# Patient Record
Sex: FEMALE | Race: WHITE | NOT HISPANIC OR LATINO | Employment: STUDENT | URBAN - METROPOLITAN AREA
[De-identification: names, ages, dates, MRNs, and addresses within clinical notes are randomized per-mention and may not be internally consistent; named-entity substitution may affect disease eponyms.]

---
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
## 2023-01-11 ENCOUNTER — HOSPITAL ENCOUNTER (OUTPATIENT)
Dept: ULTRASOUND IMAGING | Facility: HOSPITAL | Age: 18
Discharge: HOME | End: 2023-01-11
Attending: INTERNAL MEDICINE

## 2023-01-11 DIAGNOSIS — R22.1 NECK SWELLING: ICD-10-CM

## 2023-03-03 ENCOUNTER — OFFICE VISIT (OUTPATIENT)
Dept: INTERNAL MEDICINE CLINIC | Facility: CLINIC | Age: 18
End: 2023-03-03

## 2023-03-03 VITALS
SYSTOLIC BLOOD PRESSURE: 98 MMHG | HEART RATE: 95 BPM | OXYGEN SATURATION: 98 % | BODY MASS INDEX: 21.31 KG/M2 | WEIGHT: 124.8 LBS | HEIGHT: 64 IN | DIASTOLIC BLOOD PRESSURE: 60 MMHG | RESPIRATION RATE: 16 BRPM

## 2023-03-03 DIAGNOSIS — F41.9 ANXIETY: Primary | ICD-10-CM

## 2023-03-03 DIAGNOSIS — F17.298 OTHER TOBACCO PRODUCT NICOTINE DEPENDENCE WITH OTHER NICOTINE-INDUCED DISORDER: ICD-10-CM

## 2023-03-03 DIAGNOSIS — D64.9 ANEMIA, UNSPECIFIED TYPE: ICD-10-CM

## 2023-03-03 RX ORDER — SERTRALINE HYDROCHLORIDE 25 MG/1
TABLET, FILM COATED ORAL
Qty: 30 TABLET | Refills: 0 | Status: SHIPPED | OUTPATIENT
Start: 2023-03-03

## 2023-03-03 RX ORDER — FLUCONAZOLE 150 MG/1
150 TABLET ORAL ONCE
COMMUNITY
Start: 2022-12-29

## 2023-03-16 ENCOUNTER — APPOINTMENT (EMERGENCY)
Dept: RADIOLOGY | Facility: HOSPITAL | Age: 18
End: 2023-03-16

## 2023-03-16 ENCOUNTER — HOSPITAL ENCOUNTER (EMERGENCY)
Facility: HOSPITAL | Age: 18
Discharge: HOME | End: 2023-03-16
Attending: EMERGENCY MEDICINE

## 2023-03-16 VITALS
HEART RATE: 90 BPM | SYSTOLIC BLOOD PRESSURE: 115 MMHG | RESPIRATION RATE: 18 BRPM | DIASTOLIC BLOOD PRESSURE: 60 MMHG | TEMPERATURE: 98.3 F | OXYGEN SATURATION: 100 %

## 2023-03-16 DIAGNOSIS — S69.92XA LEFT WRIST INJURY, INITIAL ENCOUNTER: ICD-10-CM
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
## 2023-03-16 RX ORDER — IBUPROFEN 600 MG/1
600 TABLET ORAL ONCE
Status: DISCONTINUED | OUTPATIENT
Start: 2023-03-16 | End: 2023-03-16 | Stop reason: HOSPADM

## 2023-03-16 NOTE — Clinical Note
Kan Clement was seen and treated in our emergency department on 3/16/2023  Diagnosis:     Rosamaria Russo  may return to work on return date  She may return on this date: 03/20/2023         If you have any questions or concerns, please don't hesitate to call        Philipp Raza PA-C    ______________________________           _______________          _______________  Hospital Representative                              Date                                Time
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
## 2023-05-11 DIAGNOSIS — Z30.41 ENCOUNTER FOR SURVEILLANCE OF CONTRACEPTIVE PILLS: ICD-10-CM

## 2023-05-11 RX ORDER — NORGESTIMATE AND ETHINYL ESTRADIOL 7DAYSX3 28
KIT ORAL
Qty: 28 TABLET | Refills: 11 | Status: SHIPPED | OUTPATIENT
Start: 2023-05-11

## 2023-05-16 ENCOUNTER — OFFICE VISIT (OUTPATIENT)
Dept: OBGYN CLINIC | Facility: CLINIC | Age: 18
End: 2023-05-16

## 2023-05-16 VITALS
DIASTOLIC BLOOD PRESSURE: 84 MMHG | WEIGHT: 118 LBS | HEIGHT: 64 IN | SYSTOLIC BLOOD PRESSURE: 132 MMHG | BODY MASS INDEX: 20.14 KG/M2

## 2023-05-16 DIAGNOSIS — Z11.3 SCREENING FOR STDS (SEXUALLY TRANSMITTED DISEASES): Primary | ICD-10-CM

## 2023-05-16 NOTE — PROGRESS NOTES
Assessment/Plan:    Screening for STDs (sexually transmitted diseases)  -declined serology testing    -condoms recommended with all sexual activity to prevent STIs  -call with any questions or concerns  F/u for annual         Diagnoses and all orders for this visit:    Screening for STDs (sexually transmitted diseases)  -     Chlamydia/GC amplified DNA by PCR  -     Trichomonas vaginalis Thin prep          Subjective:      Patient ID: Nai Luna is a 19 y o  female presents for STD screening  She reports recent unprotected intercourse with a new partner and would be to be safe  Recently treated for chlamydia 6 months ago  She denies any symptoms or concerns today  She uses OCPs for contraception with consistent use  The following portions of the patient's history were reviewed and updated as appropriate:   She  has a past medical history of Anxiety, Candida infection of genital region (6/17/2020), and Tonsillitis  She   Patient Active Problem List    Diagnosis Date Noted   • Surveillance of contraceptive pill 06/12/2019   • Vitamin D deficiency 07/09/2018   • Anxiety 09/18/2017     She  has a past surgical history that includes No past surgeries  Her family history includes ADD / ADHD in her sister; Anxiety disorder in her mother and sister  She  reports that she has been smoking e-cigarettes  She has never used smokeless tobacco  She reports that she does not drink alcohol and does not use drugs  Current Outpatient Medications   Medication Sig Dispense Refill   • norgestimate-ethinyl estradiol (ORTHO TRI-CYCLEN,TRINESSA) 0 18/0 215/0 25 MG-35 MCG per tablet TAKE 1 TABLET BY MOUTH EVERY DAY 28 tablet 11   • fluconazole (DIFLUCAN) 150 mg tablet Take 150 mg by mouth once (Patient not taking: Reported on 5/16/2023)     • sertraline (ZOLOFT) 25 mg tablet Take 25 mg PO x 2 weeks; then 25 mg PO every other day x 2 weeks; then STOP  30 tablet 0     No current facility-administered medications for this visit  "    She has No Known Allergies       Review of Systems   Constitutional: Negative for chills and fever  Gastrointestinal: Negative for abdominal pain  Genitourinary: Negative for dyspareunia, dysuria, genital sores, menstrual problem, pelvic pain, vaginal bleeding, vaginal discharge and vaginal pain  Musculoskeletal: Negative for myalgias  Skin: Negative  Neurological: Negative  Hematological: Negative for adenopathy  Psychiatric/Behavioral: Negative for confusion  Objective:      /84 (BP Location: Left arm, Patient Position: Sitting, Cuff Size: Standard)   Ht 5' 3 75\" (1 619 m)   Wt 53 5 kg (118 lb)   LMP 05/14/2023 (Exact Date)   BMI 20 41 kg/m²          Physical Exam  Vitals and nursing note reviewed  Constitutional:       General: She is not in acute distress  Appearance: Normal appearance  She is not ill-appearing  HENT:      Head: Normocephalic and atraumatic  Eyes:      Conjunctiva/sclera: Conjunctivae normal    Pulmonary:      Effort: Pulmonary effort is normal    Abdominal:      Palpations: Abdomen is soft  Tenderness: There is no abdominal tenderness  Genitourinary:     General: Normal vulva  Exam position: Lithotomy position  Labia:         Right: No rash, tenderness, lesion or injury  Left: No rash, tenderness, lesion or injury  Urethra: No prolapse, urethral pain, urethral swelling or urethral lesion  Vagina: No signs of injury and foreign body  No vaginal discharge, erythema, tenderness, bleeding, lesions or prolapsed vaginal walls  Cervix: No cervical motion tenderness, discharge, friability, lesion, erythema, cervical bleeding or eversion  Uterus: Not deviated, not enlarged, not fixed, not tender and no uterine prolapse  Musculoskeletal:         General: Normal range of motion  Cervical back: Neck supple  Lymphadenopathy:      Lower Body: No right inguinal adenopathy  No left inguinal adenopathy   " Skin:     General: Skin is warm and dry  Neurological:      General: No focal deficit present  Mental Status: She is alert     Psychiatric:         Mood and Affect: Mood normal          Behavior: Behavior normal

## 2023-05-16 NOTE — ASSESSMENT & PLAN NOTE
-declined serology testing    -condoms recommended with all sexual activity to prevent STIs  -call with any questions or concerns   F/u for annual

## 2023-05-16 NOTE — PROGRESS NOTES
Requesting STD testing after being for a new partner  No current symptoms  Hx of Chlamydia with previous partner and was asymptomatic and would just to be checked out

## 2023-05-16 NOTE — PATIENT INSTRUCTIONS
Safe Sex Practices   WHAT YOU NEED TO KNOW:   What are safe sex practices? Safe sex practices are ways to prevent pregnancy and the spread of sexually transmitted infections (STIs)  An STI happens when a virus or bacteria are spread through sexual activity  Safe sex practices help decrease or prevent body fluid exchange during sex  Body fluids include saliva, urine, blood, vaginal fluids, and semen  Oral, vaginal, and anal sex can all spread STIs  What are some safe sex practices to follow before I have sex? Talk to a new partner before you have sex  Tell your partner if you have an STI  Ask about his or her sex history and if he or she has a current or past STI  Your partner may need to be tested and treated  Do not have sex while you are being treated for an STI, or with a partner who is being treated  Limit your number of sex partners  More than one sex partner can increase your risk for an STI  Do not have sex with anyone whose sex history you do not know  Get tested for STIs if needed  Get tested if you had sex with someone who has an STI  Get tested if you have unprotected sex with any new partner  Talk to your healthcare provider about birth control  Birth control can help prevent an unwanted pregnancy  There are many different types of birth control  Talk to your healthcare provider about which birth control method is right for you  Ask about medicines to lower your risk for some STIs:      Vaccines  can help protect you from hepatitis A, hepatitis B, and the human papillomavirus (HPV)  The HPV vaccine is usually given at 11 years, but it may be given through 26 years to both females and males  Your provider can give you more information on vaccines to prevent STIs  Pre-exposure prophylaxis (PrEP)  may be given if you are at high risk for HIV  PrEP is taken every day to prevent the virus from fully infecting the body  Do not use alcohol or drugs before sex    These can prevent you from thinking clearly and increase your risk for unsafe sex  What are some safe sex practices to follow while I am having sex? Use condoms and barrier methods for all types of sexual contact  This includes oral, vaginal, and anal sex  Male and female condoms are available  Make sure that the condom fits and is put on correctly  Rubber latex sheets or dental dams can be used for oral sex  Use a new condom or latex barrier each time you have sex  Use latex condoms, if possible  Lambskin or natural condoms do not prevent STIs  If you or your partner is allergic to latex, use a nonlatex product, such as polyurethane  Use a second form of birth control with the condom to prevent pregnancy and STIs  Do not use male and female condoms together  Only use water-based lubricants during sex  Water-based lubricants help prevent sores or cuts in the vagina or on the penis  Prevent sores or cuts to decrease your risk for an STI  Do not use oil-based lubricants, such as baby oil or hand lotion, with latex condoms or barriers  These will weaken the latex and may cause the condom to break  Do not use chemicals that irritate your skin  Products that contain chemical irritants, such as spermicides, can irritate the lining of your vagina or rectum  Irritation may cause sores that can increase your risk for an STI  Be careful when you have sex if you have open sores or cuts  Open sores or cuts may increase your risk for an STI  Keep all open sores or cuts covered during sex  Do not have oral sex if you have cuts or sores in your mouth  Do not do activities that can pass germs  Do not use saliva as a lubricant or share sex toys  Where can I find more information? [id number] [address] ([facility])  P O  [address]  Web Address: http://www [employer]/  org    When should I seek immediate care? A condom breaks, leaks, or slips off while you are having sex      You notice sores on your penis, vagina, anal area, or the skin around them  You have had unsafe sex and want to discuss emergency contraception or treatment for STI exposure  When should I call my doctor? You think you or your female sex partner might be pregnant  You have questions or concerns about your condition or care  CARE AGREEMENT:   You have the right to help plan your care  Learn about your health condition and how it may be treated  Discuss treatment options with your healthcare providers to decide what care you want to receive  You always have the right to refuse treatment  The above information is an  only  It is not intended as medical advice for individual conditions or treatments  Talk to your doctor, nurse or pharmacist before following any medical regimen to see if it is safe and effective for you  © Copyright [employer] 2022 Information is for End User's use only and may not be sold, redistributed or otherwise used for commercial purposes

## 2023-05-18 LAB
C TRACH DNA SPEC QL NAA+PROBE: NEGATIVE
N GONORRHOEA DNA SPEC QL NAA+PROBE: NEGATIVE
T VAGINALIS DNA SPEC QL NAA+PROBE: NEGATIVE

## 2023-06-12 ENCOUNTER — TELEPHONE (OUTPATIENT)
Dept: INTERNAL MEDICINE CLINIC | Facility: CLINIC | Age: 18
End: 2023-06-12

## 2023-06-12 DIAGNOSIS — F41.9 ANXIETY: ICD-10-CM

## 2023-06-12 NOTE — TELEPHONE ENCOUNTER
Irving Johnson MD  You; [facility] 4 minutes ago (2:13 PM)       We can start it now, was she on 25 or 50 of zoloft   Can also keep appointmnet if she wishes

## 2023-06-12 NOTE — TELEPHONE ENCOUNTER
Pt set up appt for 7/11 to talk about restarting zoloft again       If theres a way to do this before then please advise for her

## 2023-06-29 NOTE — PROGRESS NOTES
Diagnoses and all orders for this visit:    Encounter for gynecological examination without abnormal finding    Encounter for surveillance of contraceptive pills  -     norgestimate-ethinyl estradiol (ORTHO TRI-CYCLEN,TRINESSA) 0 18/0 215/0 25 MG-35 MCG per tablet; Take 1 tablet by mouth daily    Keloid scar  -     Ambulatory Referral to Plastic Surgery; Future      Reviewed ACHES   Perineal hygiene reviewed   Weight bearing exercises minium of 150 mins/weekly advised  Kegel exercises recommended  Reviewed safe sex practices, Condoms encouraged with all sexual activity to prevent STI's  SBE encouraged, ASCCP guidelines reviewed will start Pap's at age 24  Gardisil vaccines recommended up to age 39  Calcium/ Vit D dietary requirements discussed,   Advised to call with any issues,  all concerns & questions addressed  See provided information in your after visit summary     F/U Annually and PRN      Health Maintenance:    Gardisil: Completed     Subjective    CC: Yearly Exam      Kemar Martinez is a 19 y o  female here for an annual exam  Danilo Fontaine  GYN hx includes:  OCP use   No personal Hx of breast, cervical, ovarian or colon CA  Family hx of:  No GYN cancers  Medically stable, reports no changes in medical Hx, follows with PMD    Patient's last menstrual period was 06/11/2023 (exact date)  Her menstrual cycles are regular every 28-30 days on OCP's  She denies issues with bleeding during her menses  She denies breast concerns, abnormal vaginal discharge, vaginal itching, odor, irritation, bowel/bladder dysfunction, urinary symptoms, pelvic pain, or dyspareunia today  She is sexually active  Monogamous relationship  Her current method of contraception includes OCPs  Denies any issues with her BCM  She does not want STD testing today   Had recent testing Neg  Denies intimate partner violence    Works at [employer] for [employer]     Past Medical History: Diagnosis Date   • Anxiety    • Candida infection of genital region 6/17/2020   • Tonsillitis      Past Surgical History:   Procedure Laterality Date   • NO PAST SURGERIES     • WISDOM TOOTH EXTRACTION         Immunization History   Administered Date(s) Administered   • COVID-19 PFIZER VACCINE 0 3 ML IM 11/16/2021, 12/07/2021   • DTaP 5 2003, 2003, 07/19/2004, 02/14/2005, 08/15/2007   • HPV9 04/17/2017, 10/01/2018   • Hep A, adult 08/19/2008, 11/30/2009   • Hep B / HiB 2003, 2003, 01/14/2004   • Hep B, adult 2003   • INFLUENZA 11/04/2021, 10/05/2022   • IPV 2003, 2003, 04/19/2004, 08/15/2007   • Influenza, seasonal, injectable 2003, 01/14/2004, 11/06/2007   • MMR 04/19/2004   • MMRV 08/15/2007   • Meningococcal MCV4P 08/30/2019   • Meningococcal, Unknown Serogroups 07/11/2014   • Pneumococcal Conjugate PCV 7 2003, 2003, 2003, 02/14/2005   • Tdap 07/11/2014   • Varicella 01/14/2004       Family History   Problem Relation Age of Onset   • Anxiety disorder Mother    • ADD / ADHD Sister    • Anxiety disorder Sister    • Breast cancer Neg Hx    • Colon cancer Neg Hx    • Ovarian cancer Neg Hx    • Uterine cancer Neg Hx    • Cervical cancer Neg Hx      Social History     Tobacco Use   • Smoking status: Every Day     Types: E-Cigarettes   • Smokeless tobacco: Never   • Tobacco comments:     vape   Vaping Use   • Vaping Use: Every day   • Substances: Nicotine, Flavoring   Substance Use Topics   • Alcohol use: Never   • Drug use: Never       Current Outpatient Medications:   •  norgestimate-ethinyl estradiol (ORTHO TRI-CYCLEN,TRINESSA) 0 18/0 215/0 25 MG-35 MCG per tablet, Take 1 tablet by mouth daily, Disp: 84 tablet, Rfl: 4  •  sertraline (ZOLOFT) 50 mg tablet, Take 1 tablet (50 mg total) by mouth daily, Disp: 90 tablet, Rfl: 0  Patient Active Problem List    Diagnosis Date Noted   • Screening for STDs (sexually transmitted diseases) 05/16/2023   • "Surveillance of contraceptive pill 2019   • Vitamin D deficiency 2018   • Anxiety 2017       No Known Allergies    OB History    Para Term  AB Living   0 0 0 0 0 0   SAB IAB Ectopic Multiple Live Births   0 0 0 0 0       Vitals:    23 0913   BP: 110/70   BP Location: Right arm   Patient Position: Sitting   Cuff Size: Large   Weight: 53 1 kg (117 lb)   Height: 5' 3\" (1 6 m)     Body mass index is 20 73 kg/m²  Review of Systems     Constitutional: Negative for chills, fatigue, fever, headaches, visual disturbances, and unexpected weight change  Respiratory: Negative for cough, & shortness of breath  Cardiovascular: Negative for chest pain       Gastrointestinal: Negative for Abd pain, nausea & vomiting, constipation and diarrhea  Genitourinary: Negative for difficulty urinating, dysuria, hematuria, dyspareunia, unusual vaginal bleeding or discharge  Skin: Negative skin changes    Physical Exam     Constitutional: Alert & Oriented x3, well-developed and well-nourished  No distress  HENT: Atraumatic, Normocephalic, Conjunctivae clear  Neck: Normal range of motion  Neck supple  No thyromegaly, mass, nodules or tenderness  Pulmonary: Effort normal    Abdominal: Soft  No tenderness or masses  Musculoskeletal: Normal ROM  Skin: Warm & Dry  Psychological: Normal mood, thought content, behavior & judgement     Breasts:   Right: tissue soft without masses, tenderness, skin changes or nipple discharge  No areas of erythema or pain  No subclavicular, axillary, pectoral adenopathy  Left:  tissue soft without masses, tenderness, skin changes or nipple discharge  No areas of erythema or pain  No subclavicular, axillary, pectoral adenopathy  2 keloids noted mid sternum from previous piercing, she would like them removed     Pelvic exam was performed with patient supine, lithotomy position  Labia: Negative rash, tenderness, lesion or injury on the right labia                Negative " rash, tenderness, lesion or injury on the left labia  Urethral meatus:  Negative for  tenderness, inflammation or discharge  Uterus: not deviated, enlarged, fixed or tender  Cervix: No CMT, no discharge or friability  Right adnexa: no mass, no tenderness and no fullness  Left adnexa: no mass, no tenderness and no fullness  Vagina: No erythema, tenderness, masses, or foreign body in the vagina  No signs of injury around the vagina  No unusual vaginal discharge   Perineum without lesions, signs of injury, erythema or swelling  Inguinal Canal:        Right: No inguinal adenopathy or hernia present  Left: No inguinal adenopathy or hernia present

## 2023-06-29 NOTE — PROGRESS NOTES
LMP: 2023  PMB:  SA:  YES   HPV:  YES   All doses   Birth control: Pills  Last pap: Not on file  Last mammo: Not on file:  Last colonoscopy: Not on file  Last Dexa: Not on file  Family History: NO GYN in the family history

## 2023-06-29 NOTE — PATIENT INSTRUCTIONS
Breast Self Exam for Women   AMBULATORY CARE:   A breast self-exam (BSE)  is a way to check your breasts for lumps and other changes  Regular BSEs can help you know how your breasts normally look and feel  Most breast lumps or changes are not cancer, but you should always have them checked by a healthcare provider  Why you should do a BSE:  Breast cancer is the most common type of cancer in women  Even if you have mammograms, you may still want to do a BSE regularly  If you know how your breasts normally feel and look, it may help you know when to contact your healthcare provider  Mammograms can miss some cancers  You may find a lump during a BSE that did not show up on a mammogram   When you should do a BSE:  If you have periods, you may want to do your BSE 1 week after your period ends  This is the time when your breasts may be the least swollen, lumpy, or tender  You can do regular BSEs even if you are breastfeeding or have breast implants  Call your doctor if:   You find any lumps or changes in your breasts  You have breast pain or fluid coming from your nipples  You have questions or concerns about your condition or care  How to do a BSE:       Look at your breasts in a mirror  Look at the size and shape of each breast and nipple  Check for swelling, lumps, dimpling, scaly skin, or other skin changes  Look for nipple changes, such as a nipple that is painful or beginning to pull inward  Gently squeeze both nipples and check to see if fluid (that is not breast milk) comes out of them  If you find any of these or other breast changes, contact your healthcare provider  Check your breasts while you sit or  the following 3 positions:    [place] your arms down at your sides  Raise your hands and join them behind your head  Put firm pressure with your hands on your hips  Bend slightly forward while you look at your breasts in the mirror  Lie down and feel your breasts    When you lie down, your breast tissue spreads out evenly over your chest  This makes it easier for you to feel for lumps and anything that may not be normal for your breasts  Do a BSE on one breast at a time  Place a small pillow or towel under your left shoulder  Put your left arm behind your head  Use the 3 middle fingers of your right hand  Use your fingertip pads, on the top of your fingers  Your fingertip pad is the most sensitive part of your finger  Use small circles to feel your breast tissue  Use your fingertip pads to make dime-sized, overlapping circles on your breast and armpits  Use light, medium, and firm pressure  First, press lightly  Second, press with medium pressure to feel a little deeper into the breast  Last, use firm pressure to feel deep within your breast     Examine your entire breast area  Examine the breast area from above the breast to below the breast where you feel only ribs  Make small circles with your fingertips, starting in the middle of your armpit  Make circles going up and down the breast area  Continue toward your breast and all the way across it  Examine the area from your armpit all the way over to the middle of your chest (breastbone)  Stop at the middle of your chest     Move the pillow or towel to your right shoulder, and put your right arm behind your head  Use the 3 fingertip pads of your left hand, and repeat the above steps to do a BSE on your right breast   What else you can do to check for breast problems or cancer:  Talk to your healthcare provider about mammograms  A mammogram is an x-ray of your breasts to screen for breast cancer or other problems  Your provider can tell you the benefits and risks of mammograms  The first mammogram is usually at age 39 or 48  Your provider may recommend you start at 36 or younger if your risk for breast cancer is high  Mammograms usually continue every 1 to 2 years until age 76         Follow up with your doctor as directed:  Write down your questions so you remember to ask them during your visits  © Evin V[employer]quez 2022 Information is for End User's use only and may not be sold, redistributed or otherwise used for commercial purposes  The above information is an  only  It is not intended as medical advice for individual conditions or treatments  Talk to your doctor, nurse or pharmacist before following any medical regimen to see if it is safe and effective for you  Wellness Visit for Adults   AMBULATORY CARE:   A wellness visit  is when you see your healthcare provider to get screened for health problems  Your healthcare provider will also give you advice on how to stay healthy  Write down your questions so you remember to ask them  Ask your healthcare provider how often you should have a wellness visit  What happens at a wellness visit:  Your healthcare provider will ask about your health, and your family history of health problems  This includes high blood pressure, heart disease, and cancer  He or she will ask if you have symptoms that concern you, if you smoke, and about your mood  You may also be asked about your intake of medicines, supplements, food, and alcohol  Any of the following may be done: Your weight  will be checked  Your height may also be checked so your body mass index (BMI) can be calculated  Your BMI shows if you are at a healthy weight  Your blood pressure  and heart rate will be checked  Your temperature may also be checked  Blood and urine tests  may be done  Blood tests may be done to check your cholesterol levels  Abnormal cholesterol levels increase your risk for heart disease and stroke  You may also need a blood or urine test to check for diabetes if you are at increased risk  Urine tests may be done to look for signs of an infection or kidney disease  A physical exam  includes checking your heartbeat and lungs with a stethoscope   Your healthcare provider may also check your skin to look for sun damage  Screening tests  may be recommended  A screening test is done to check for diseases that may not cause symptoms  The screening tests you may need depend on your age, gender, family history, and lifestyle habits  For example, colorectal screening may be recommended if you are 48years old or older  Screening tests you need if you are a woman:   A Pap smear  is used to screen for cervical cancer  Pap smears are usually done every 3 to 5 years depending on your age  You may need them more often if you have had abnormal Pap smear test results in the past  Ask your healthcare provider how often you should have a Pap smear  A mammogram  is an x-ray of your breasts to screen for breast cancer  Experts recommend mammograms every 2 years starting at age 48 years  You may need a mammogram at age 52 years or younger if you have an increased risk for breast cancer  Talk to your healthcare provider about when you should start having mammograms and how often you need them  Vaccines you may need:   Get an influenza vaccine  every year  The influenza vaccine protects you from the flu  Several types of viruses cause the flu  The viruses change over time, so new vaccines are made each year  Get a tetanus-diphtheria (Td) booster vaccine  every 10 years  This vaccine protects you against tetanus and diphtheria  Tetanus is a severe infection that may cause painful muscle spasms and lockjaw  Diphtheria is a severe bacterial infection that causes a thick covering in the back of your mouth and throat  Get a human papillomavirus (HPV) vaccine  if you are female and aged 23 to 32 or male 23 to 24 and never received it  This vaccine protects you from HPV infection  HPV is the most common infection spread by sexual contact  HPV may also cause vaginal, penile, and anal cancers  Get a pneumococcal vaccine  if you are aged 72 years or older   The pneumococcal vaccine is an injection given to protect you from pneumococcal disease  Pneumococcal disease is an infection caused by pneumococcal bacteria  The infection may cause pneumonia, meningitis, or an ear infection  Get a shingles vaccine  if you are 60 or older, even if you have had shingles before  The shingles vaccine is an injection to protect you from the varicella-zoster virus  This is the same virus that causes chickenpox  Shingles is a painful rash that develops in people who had chickenpox or have been exposed to the virus  How to eat healthy:  My Plate is a model for planning healthy meals  It shows the types and amounts of foods that should go on your plate  Fruits and vegetables make up about half of your plate, and grains and protein make up the other half  A serving of dairy is included on the side of your plate  The amount of calories and serving sizes you need depends on your age, gender, weight, and height  Examples of healthy foods are listed below:  Eat a variety of vegetables  such as dark green, red, and orange vegetables  You can also include canned vegetables low in sodium (salt) and frozen vegetables without added butter or sauces  Eat a variety of fresh fruits , canned fruit in 100% juice, frozen fruit, and dried fruit  Include whole grains  At least half of the grains you eat should be whole grains  Examples include whole-wheat bread, wheat pasta, brown rice, and whole-grain cereals such as oatmeal     Eat a variety of protein foods such as seafood (fish and shellfish), lean meat, and poultry without skin (turkey and chicken)  Examples of lean meats include pork leg, shoulder, or tenderloin, and beef round, sirloin, tenderloin, and extra lean ground beef  Other protein foods include eggs and egg substitutes, beans, peas, soy products, nuts, and seeds  Choose low-fat dairy products such as skim or 1% milk or low-fat yogurt, cheese, and cottage cheese  Limit unhealthy fats  such as butter, hard margarine, and shortening  Exercise:  Exercise at least 30 minutes per day on most days of the week  Some examples of exercise include walking, biking, dancing, and swimming  You can also fit in more physical activity by taking the stairs instead of the elevator or parking farther away from stores  Include muscle strengthening activities 2 days each week  Regular exercise provides many health benefits  It helps you manage your weight, and decreases your risk for type 2 diabetes, heart disease, stroke, and high blood pressure  Exercise can also help improve your mood  Ask your healthcare provider about the best exercise plan for you  General health and safety guidelines:   Do not smoke  Nicotine and other chemicals in cigarettes and cigars can cause lung damage  Ask your healthcare provider for information if you currently smoke and need help to quit  E-cigarettes or smokeless tobacco still contain nicotine  Talk to your healthcare provider before you use these products  Limit alcohol  A drink of alcohol is 12 ounces of beer, 5 ounces of wine, or 1½ ounces of liquor  Lose weight, if needed  Being overweight increases your risk of certain health conditions  These include heart disease, high blood pressure, type 2 diabetes, and certain types of cancer  Protect your skin  Do not sunbathe or use tanning beds  Use sunscreen with a SPF 15 or higher  Apply sunscreen at least 15 minutes before you go outside  Reapply sunscreen every 2 hours  Wear protective clothing, hats, and sunglasses when you are outside  Drive safely  Always wear your seatbelt  Make sure everyone in your car wears a seatbelt  A seatbelt can save your life if you are in an accident  Do not use your cell phone when you are driving  This could distract you and cause an accident  Pull over if you need to make a call or send a text message  Practice safe sex  Use latex condoms if are sexually active and have more than one partner   Your healthcare provider may recommend screening tests for sexually transmitted infections (STIs)  Wear helmets, lifejackets, and protective gear  Always wear a helmet when you ride a bike or motorcycle, go skiing, or play sports that could cause a head injury  Wear protective equipment when you play sports  Wear a lifejacket when you are on a boat or doing water sports  © Copyright [employer] 2022 Information is for End User's use only and may not be sold, redistributed or otherwise used for commercial purposes  The above information is an  only  It is not intended as medical advice for individual conditions or treatments  Talk to your doctor, nurse or pharmacist before following any medical regimen to see if it is safe and effective for you  Safe Sex Practices   AMBULATORY CARE:   Safe sex practices  are ways to prevent pregnancy and the spread of sexually transmitted infections (STIs)  An STI happens when a virus or bacteria are spread through sexual activity  Safe sex practices help decrease or prevent body fluid exchange during sex  Body fluids include saliva, urine, blood, vaginal fluids, and semen  Oral, vaginal, and anal sex can all spread STIs  Seek care immediately if:   A condom breaks, leaks, or slips off while you are having sex  You notice sores on your penis, vagina, anal area, or skin around them  You have had unsafe sex and want to discuss emergency contraception or treatment for STI exposure  Call your doctor if:   You or your female sex partner might be pregnant  You have questions or concerns about your condition or care  Safe sex practices to follow before you have sex:   Talk to a new partner before you have sex  Tell your partner if you have an STI  Ask about his or her sex history and if he or she has a current or past STI  Your partner may need to be tested and treated   Do not have sex while you are being treated for an STI, or with a partner who is being treated  Limit your number of sex partners  More than one sex partner can increase your risk for an STI  Do not have sex with anyone whose sex history you do not know  Get tested for STIs if needed  Get tested if you had sex with someone who has an STI  Get tested if you have unprotected sex with any new partner  Talk to your healthcare provider about birth control  Birth control can help prevent an unwanted pregnancy  There are many different types of birth control  Talk to your healthcare provider about which birth control method is right for you  Ask about medicines to lower your risk for some STIs:      Vaccines  can help protect you from hepatitis A, hepatitis B, and the human papillomavirus (HPV)  The HPV vaccine is usually given at 11 years, but it may be given through 26 years to both females and males  Your provider can give you more information on vaccines to prevent STIs  Pre-exposure prophylaxis (PrEP)  may be given if you are at high risk for HIV  PrEP is taken every day to prevent the virus from fully infecting the body  Do not use alcohol or drugs before sex  These can prevent you from thinking clearly and increase your risk for unsafe sex  Safe sex practices to follow while you are having sex:   Use condoms and barrier methods for all types of sexual contact  This includes oral, vaginal, and anal sex  Male and female condoms are available  Make sure that the condom fits and is put on correctly  Rubber latex sheets or dental dams can be used for oral sex  Use a new condom or latex barrier each time you have sex  Use latex condoms, if possible  Lambskin or natural condoms do not prevent STIs  If you or your partner is allergic to latex, use a nonlatex product, such as polyurethane  Use a second form of birth control with the condom to prevent pregnancy and STIs  Do not use male and female condoms together  Only use water-based lubricants during sex    Water-based lubricants help prevent sores or cuts in the vagina or on the penis  Prevent sores or cuts to decrease your risk for an STI  Do not use oil-based lubricants, such as baby oil or hand lotion, with latex condoms or barriers  These will weaken the latex and may cause the condom to break  Do not use chemicals that irritate your skin  Products that contain chemical irritants, such as spermicides, can irritate the lining of your vagina or rectum  Irritation may cause sores that can increase your risk for an STI  Be careful when you have sex if you have open sores or cuts  Open sores or cuts may increase your risk for an STI  Keep all open sores or cuts covered during sex  Do not have oral sex if you have cuts or sores in your mouth  Do not do activities that can pass germs  Do not use saliva as a lubricant or share sex toys  Follow up with your doctor as directed:  Write down your questions so you remember to ask them during your visits  © Copyright Annie Rogel 2022 Information is for End User's use only and may not be sold, redistributed or otherwise used for commercial purposes  The above information is an  only  It is not intended as medical advice for individual conditions or treatments  Talk to your doctor, nurse or pharmacist before following any medical regimen to see if it is safe and effective for you  Birth Control Pills     Birth control pills  are also called oral contraceptives, or the pill  It is medicine that helps prevent pregnancy by stopping ovulation  Ovulation is when the ovaries make and release an egg cell each month  If this egg gets fertilized by sperm, pregnancy occurs  You will need to take the pill at the same time every day  Your healthcare provider will tell you when to start taking the pill  You will also be told what to do if you miss a dose  Instructions will depend on the kind of birth control pills you are taking     Different kinds of birth control pills:  Some kinds are taken for 21 days in a row, followed by 7 days of placebo (no hormones) pills  Other kinds are taken for 24 days followed by 4 days of placebos  Each kind has a certain amount of female hormones  Your provider will decide on the kind that is best for you based on your age and other health conditions  Call your local emergency number (911 in the [address] [place] [address],[address]) for any of the following: You have any of the following signs of a stroke:      Numbness or drooping on one side of your face     Weakness in an arm or leg    Confusion or difficulty speaking    Dizziness, a severe headache, or vision loss    You feel lightheaded, short of breath, and have chest pain  You cough up blood  Seek care immediately if:   Your arm or leg feels warm, tender, and painful  It may look swollen and red  You have severe pain, numbness, or swelling in your arms or legs  Contact your healthcare provider if:   You have forgotten to take a birth control pill  You have mood changes, such as depression, since starting birth control pills  You have nausea or are vomiting  You have severe abdominal pain  You missed a period and have questions or concerns about being pregnant  You still have bleeding 4 months after taking birth control pills correctly  You have questions or concerns about your condition or care  What may be done before you can start taking birth control pills: You need to see your healthcare provider to get a prescription  Any of the following may be done before your healthcare provider gives you a prescription:  Your healthcare provider will ask about diseases and illnesses you have had in the past  Your provider will check your risk for blood clots, heart conditions, or stroke  Tell your provider if you had gastric bypass surgery  This surgery can affect the way your body absorbs medicines such as birth control pills      Your provider will also check your blood pressure, and may do a breast and pelvic exam  A Pap smear may also be done during the pelvic exam  This is a test to make sure you do not have abnormal changes on your cervix  You may need other tests, such as a urine test to make sure you are not pregnant  Your provider will ask if you take any medicines and if you smoke  Smoking increases your risk for stroke, heart attack, or a blood clot in your lungs  If you smoke, you should not take certain kinds of birth control pills  Advantages of birth control pills:  When birth control pills are used correctly, the chances of getting pregnant are very low  Birth control pills may help decrease bleeding and pain during your monthly period  They may also help prevent cancer of the uterus and ovaries  Disadvantages of birth control pills: You may have sudden changes in your mood or feelings while you take birth control pills  You may have nausea and a decreased sex drive  You may have an increased appetite and rapid weight gain  You may also have bleeding in between periods, less frequent periods, vaginal dryness, and breast pain  Birth control pills will not protect you from sexually transmitted infections  Rarely, some birth control pills can increase your risk for a blood clot  This may become life-threatening  If you decide you want to get pregnant: If you are planning to have a baby, ask your healthcare provider when you may stop taking your birth control pills  It may take some time for you to start ovulating again  Ask your healthcare provider for more information about pregnancy after birth control pills  When to start taking birth control pills after you have a baby: If you are not breastfeeding, you may start taking birth control pills 3 weeks after you give birth  You may be able to take certain types of birth control pills if you are breastfeeding  These pills can be started from 6 weeks to 6 months after you give birth   Ask your healthcare provider for more information about when to start taking birth control pills after you give birth  What you need to know about birth control pills and menopause:   Talk with your healthcare provider if you want to take birth control pills around menopause  Around age 39, you will enter into perimenopause  This means your hormone levels are dropping and you are ovulating less often  You can still become pregnant during this time  The risk for problems, such as miscarriage, are higher if you become pregnant after age 39  Birth control pills will prevent pregnancy, and may also help prevent or relieve some signs and symptoms of menopause  Examples are hot flashes and mood swings  Your provider will do tests when you are around age 48  The tests may show that you are in menopause  If the tests do not show menopause for sure, you may be able to continue taking the pill up to age 54  The decision will depend on your health and if you have any medical conditions, such as a blood clot  Do not smoke:  Nicotine and other chemicals in cigarettes and cigars increase your risk for a blood clot while you use birth control pills  The risk is higher if you are also 35 or older  Ask your healthcare provider for information if you currently smoke and need help to quit  E-cigarettes or smokeless tobacco still contain nicotine  Talk to your healthcare provider before you use these products  Follow up with your healthcare provider as directed:  Write down your questions so you remember to ask them during your visits  © Copyright [address] Information is for End User's use only and may not be sold, redistributed or otherwise used for commercial purposes  All illustrations and images included in CareNotes® are the copyrighted property of A D [employer] M , Inc  or [facility] Alisson Monahan   The above information is an  only  It is not intended as medical advice for individual conditions or treatments   Talk to your doctor, nurse or pharmacist before following any medical regimen to see if it is safe and effective for you  HPV (Human Papillomavirus)   AMBULATORY CARE:   Human Papillomavirus (HPV)  is the name for a group of viruses that can infect your skin or other parts of your body  HPV is the most common infection spread by sexual contact  It can also be spread from a mother to her baby during delivery  Common symptoms include the following:   Painless warts in your mouth or on your genitals    Genital or anal discharge, bleeding, itching, or pain    Pain when you urinate    Call your doctor if:   You have new or worsening symptoms  You have questions or concerns about your condition or care  HPV diagnosis:  Your healthcare provider may use a vinegar liquid to help diagnose HPV genital warts  Women 27to 72years old can be checked for HPV during regular cervical cancer screenings  An HPV test checks for certain types of HPV that can cause changes in cervical cells  Without treatment, the changed cells can become cancer  An HPV test can be done every 5 years if the results show no infection  The test can be done with or without a Pap smear  A Pap smear checks for cancer or for abnormal cells that can become cancer  You may be tested for HPV if you have mouth or throat cancer  Treatment:  HPV cannot be cured, but an infection may go away on its own in about 2 years without causing problems  If the infection continues, some types of HPV can lead to health conditions that need to be treated  Examples include warts and certain cancers, especially squamous cell carcinoma (SCC)  HPV-linked SCCs commonly develop in the anus, throat (called oropharyngeal cancer), cervix, vagina, penis, or mouth  HPV can also cause a type of cervical cancer called adenocarcinoma  Symptoms of any of these conditions may not develop for several years after you were exposed to HPV  You will need to be monitored closely   Ask your healthcare provider for more information about monitoring, conditions caused by HPV, and available treatments  Prevent an HPV infection:  HPV is usually spread through sexual activity  The following can help prevent infection:  Ask about the HPV vaccine  The HPV vaccine is given to females and males, usually at 6or 15years of age  It can be given from 9 years through 39years of age, if needed  It is most effective if given before sexual activity begins  Use a new condom, contraceptive barrier, or dental dam each time you have sex  This includes oral, vaginal, and anal sex  Talk to your healthcare provider if you have any questions about what to use or how to use it  Follow up with your doctor as directed:  Write down your questions so you remember to ask them during your visits  © [facility] 2022 Information is for End User's use only and may not be sold, redistributed or otherwise used for commercial purposes  The above information is an  only  It is not intended as medical advice for individual conditions or treatments  Talk to your doctor, nurse or pharmacist before following any medical regimen to see if it is safe and effective for you  Perineal Hygiene      Your vaginal naturally takes care of its self, it is a self washing system, the less you mess the healthier it will be     No soaps or feminine wash to the vulva, these products can cause dermitis, bacterial infections and other vulvar problems  Use only water to cleanse, or water with Dove or [employer] if necessary  No scented lotions or products are advised in or near your vulva  Use only coconut oil for moisture if needed  No douching this may cause imbalance in your vaginal PH and further issues      If you wear panty liners, you may apply a thin coating of Vaseline, A&D ointment or coconut oil to the vulvar tissues as a skin barrier     Cotton underware, loose fitting clothing  Only perfume-free, dye-free laundry detergent, use a second rinse cycle   Avoid fabric softeners/dryer sheets  Partner should avoid the same products as well  Over the counter probiotic to restore vaginal cheli may be helpful as well, take daily  You may also look into Boric Acid vaginal suppositories to restore vaginal PH balance for up to 2 weeks as directed on the box  You may not use these if you are pregnant      For vaginal dryness: You may use:     Coconut oil (organic, pure, unscented) as needed for moisture or lubrication  ( Do not use if allergic)       Replens moisture restore external comfort gel daily ( use as directed on the box)        Replens long lasting vaginal moisturizer  ( use as directed on the box)         For Vaginal Lubrication:          You may use:     Coconut oil (organic, pure, unscented) as a lubricant or another scent-free lubricant (Astroglide, Uberlube) if needed  Do not use coconut oil or silicone if using a condom as this may break down the integrity of the condom and cause an unplanned pregnancy              Do not use coconut oil if allergic               Replens silky smooth lubricant, premium silicone based lubricant for intercourse  ( use as directed, a small amount will provide an enhanced natural feeling)     Any premium over the counter vaginal lubricant water or silicone based  Silicone based will have more staying power  Acne   INFORMAZIONI GENERALI:   Cos’è l’acne? L’acne è jacquelnie W  R  Nancy pelle josh è comune negli adolescenti  L’acne di solito migliora col tempo, ma può continuare in età adulta per alcune persone  Cosa causa o aumenta il flex rischio di acne? L’acne si verifica quando i pori vengono bloccati da cellule morte tammi pelle, olio o batteri  I pori sono aperture lawrence pelle dove vengono prodotti olio, sudore e peli  L’acne può essere causata da alti livelli ormonali rocio la pubertà   I cambiamenti ormonali causati dalla Ronal Pick, lou cicli mestruali o dalla gravidanza possono causare l’acne manfred donne  Anche i farmaci come gli antidepressivi e gli antiepilettici possono causare l’acne  Jacqueline pelle sensibile o jacqueline storia familiare di acne aumentano il rischio  Boni Denis può peggiorare l’acne:  Cosmetici o prodotti per capelli a base di olio, oppure olio abbronzante    Strofinare la pelle troppo forte o schiacciare i brufoli    Stress    Pressione sulla pelle da caschi o zaini sportivi    Mangiare grandi quantità di zucchero o latticini    Alcune condizioni mediche come il morbo di [place] o la sindrome di [place]    Fumo    Quali sono i diversi tipi di acne? L’acne appare più spesso johns viso, collo, parte superiore del petto, schiena e parte superiore Babetta Morro  Punti maranda sono protuberanze chiuse e bianche josh si formano quando il poro è completamente bloccato  Punti gilmar sono piccole macchie scure josh si formano quando il poro è bloccato ma rimane aperto  Brufoli sono protuberanze infiammate josh contengono pus  Sono spesso causati da pori ostruiti  I brufoli si sviluppano quando i punti maranda o gilmar si infettano  Acne cistica è costituita da grandi noduli infiammati o cisti josh contengono pus  Hanno l’aspetto di grandi brufoli e si formano in profondità lawrence pelle  Possono causare dolore e cicatrici  Come si diagnostica l’acne? Il medico esaminerà la pelle e chiederà se si è romero ricevuto un trattamento per l’acne  Comunicargli quanto dura l’acne e quali prodotti per la yonatan del viso si usano  Il medico potrebbe chiedere se qualcosa rende l’acne shannen o peggiore  Si potrebbe aver bisogno di esami del sangue per controllare i livelli ormonali  Come si yonatan l’acne? Il trattamento dipende dalla gravità dell’acne  Il medico potrebbe consigliare:  Farmaci da banco per [employer] con perossido di benzoile e acido salicilico possono aiutare a trattare l’acne lieve  Sono disponibili kodak forma di gel, lozioni, creme, pastiglie o saplaura Duckworth essere necessarie diverse settimane per vedere un miglioramento  Seguire le indicazioni sull’etichetta del farmaco  Non usare quantità superiori a quella indicata  Questo farmaco può causare pelle secca e flo se se ne [place] troppo o troppo spesso  Farmaci con prescrizione medica possono essere necessari se i farmaci da banco non aiutano dopo 2 dillon  Potrebbe essere necessario prendere più di un tipo di farmaco per Christy  Un tipo di farmaco johns prescrizione per [employer] retinoidi può causare gravi difetti gino nascita  Non  usare questo farmaco in [facility] o se [facility] è possibile  Fototerapia può aiutare a diminuire l’acne  Chiedere al medico ulteriori informazioni sulla fototerapia  Cos’altro dave fare per gestire o prevenire l’acne? Lavare il viso 2 volte al giorno con un detergente delicato  Katrinka Closs a diminuire l’accumulo di olio josh armand all’acne  Inoltre lavare il viso se si è sudato molto, come dopo l’esercizio fisico     Usare prodotti senza olio  Questo include la protezione solare, le creme idratanti e i cosmetici  Anche i prodotti per capelli dowilliano essere senza olio  Lavarsi regolarmente i capelli per diminuire l’olio  I capelli michelle josh toccano il viso possono aumentare l’acne  Evitare di toccarsi il viso quanto più possibile  Non schiacciare, spremere o far scoppiare i brufoli  Karl Darter può peggiorare l’acne perché le sirena contengono olio  Può anche causare la formazione di cicatrici sul viso  Evitare le cose josh sfregano contro la pelle quanto più possibile  Questo include cappelli, caschi e zaini  Fontaine Rash il medico?  Si [place] un farmaco retinoide e si pensa di poter essere in [facility] [place]  [place] un farmaco retinoide e si cominciano ad avere sbalzi d’umore o cambiamenti di personalità  Ci si sente depresse  Si ha la febbre e [facility] pelle  L’acne non migliora, anche dopo il trattamento      Si hanno domande o dubbi sulla propria patologia o Ami Petite  ACCORDO SUL TRATTAMENTO:   Il brigide ha il diritto di partecipare Venkat Wright del trattamento  Informarsi sul proprio stato di salute e rahel trattamenti disponibili  Discutere le opzioni di trattamento con i propri medici per decidere quale yonatan si desidera seguire  Il brigide ha sempre il diritto di rifiutare il trattamento proposto  The above information is an  only  It is not intended as medical advice for individual conditions or treatments  Talk to your doctor, nurse or pharmacist before following any medical regimen to see if it is safe and effective for you  © Copyright [employer] 2022 Information is for End User's use only and may not be sold, redistributed or otherwise used for commercial purposes

## 2023-06-30 ENCOUNTER — ANNUAL EXAM (OUTPATIENT)
Dept: OBGYN CLINIC | Facility: CLINIC | Age: 18
End: 2023-06-30
Payer: COMMERCIAL

## 2023-06-30 VITALS
HEIGHT: 63 IN | SYSTOLIC BLOOD PRESSURE: 110 MMHG | DIASTOLIC BLOOD PRESSURE: 70 MMHG | WEIGHT: 117 LBS | BODY MASS INDEX: 20.73 KG/M2

## 2023-06-30 DIAGNOSIS — Z30.41 ENCOUNTER FOR SURVEILLANCE OF CONTRACEPTIVE PILLS: ICD-10-CM

## 2023-06-30 DIAGNOSIS — Z01.419 ENCOUNTER FOR GYNECOLOGICAL EXAMINATION WITHOUT ABNORMAL FINDING: Primary | ICD-10-CM

## 2023-06-30 DIAGNOSIS — L91.0 KELOID SCAR: ICD-10-CM

## 2023-06-30 PROCEDURE — 99395 PREV VISIT EST AGE 18-39: CPT | Performed by: OBSTETRICS & GYNECOLOGY

## 2023-06-30 RX ORDER — NORGESTIMATE AND ETHINYL ESTRADIOL 7DAYSX3 28
1 KIT ORAL DAILY
Qty: 84 TABLET | Refills: 4 | Status: SHIPPED | OUTPATIENT
Start: 2023-06-30

## 2023-07-08 ENCOUNTER — APPOINTMENT (OUTPATIENT)
Age: 18
End: 2023-07-08
Payer: COMMERCIAL

## 2023-07-08 DIAGNOSIS — D64.9 ANEMIA, UNSPECIFIED TYPE: ICD-10-CM

## 2023-07-08 DIAGNOSIS — F41.9 ANXIETY: ICD-10-CM

## 2023-07-08 LAB
ALBUMIN SERPL BCP-MCNC: 3.9 G/DL (ref 3.5–5)
ALP SERPL-CCNC: 72 U/L (ref 46–116)
ALT SERPL W P-5'-P-CCNC: 21 U/L (ref 12–78)
ANION GAP SERPL CALCULATED.3IONS-SCNC: 5 MMOL/L
AST SERPL W P-5'-P-CCNC: 14 U/L (ref 5–45)
BASOPHILS # BLD AUTO: 0.04 THOUSANDS/ÂΜL (ref 0–0.1)
BASOPHILS NFR BLD AUTO: 1 % (ref 0–1)
BILIRUB SERPL-MCNC: 0.73 MG/DL (ref 0.2–1)
BUN SERPL-MCNC: 9 MG/DL (ref 5–25)
CALCIUM SERPL-MCNC: 9.3 MG/DL (ref 8.3–10.1)
CHLORIDE SERPL-SCNC: 111 MMOL/L (ref 96–108)
CO2 SERPL-SCNC: 27 MMOL/L (ref 21–32)
CREAT SERPL-MCNC: 0.75 MG/DL (ref 0.6–1.3)
EOSINOPHIL # BLD AUTO: 0.1 THOUSAND/ÂΜL (ref 0–0.61)
EOSINOPHIL NFR BLD AUTO: 2 % (ref 0–6)
ERYTHROCYTE [DISTWIDTH] IN BLOOD BY AUTOMATED COUNT: 13.3 % (ref 11.6–15.1)
FERRITIN SERPL-MCNC: 14 NG/ML (ref 11–307)
GFR SERPL CREATININE-BSD FRML MDRD: 115 ML/MIN/1.73SQ M
GLUCOSE P FAST SERPL-MCNC: 87 MG/DL (ref 65–99)
HCT VFR BLD AUTO: 42.2 % (ref 34.8–46.1)
HGB BLD-MCNC: 14.4 G/DL (ref 11.5–15.4)
IMM GRANULOCYTES # BLD AUTO: 0.01 THOUSAND/UL (ref 0–0.2)
IMM GRANULOCYTES NFR BLD AUTO: 0 % (ref 0–2)
IRON SATN MFR SERPL: 24 % (ref 15–50)
IRON SERPL-MCNC: 119 UG/DL (ref 50–170)
LYMPHOCYTES # BLD AUTO: 2.15 THOUSANDS/ÂΜL (ref 0.6–4.47)
LYMPHOCYTES NFR BLD AUTO: 32 % (ref 14–44)
MCH RBC QN AUTO: 31.4 PG (ref 26.8–34.3)
MCHC RBC AUTO-ENTMCNC: 34.1 G/DL (ref 31.4–37.4)
MCV RBC AUTO: 92 FL (ref 82–98)
MONOCYTES # BLD AUTO: 0.47 THOUSAND/ÂΜL (ref 0.17–1.22)
MONOCYTES NFR BLD AUTO: 7 % (ref 4–12)
NEUTROPHILS # BLD AUTO: 3.96 THOUSANDS/ÂΜL (ref 1.85–7.62)
NEUTS SEG NFR BLD AUTO: 58 % (ref 43–75)
NRBC BLD AUTO-RTO: 0 /100 WBCS
PLATELET # BLD AUTO: 289 THOUSANDS/UL (ref 149–390)
PMV BLD AUTO: 11.4 FL (ref 8.9–12.7)
POTASSIUM SERPL-SCNC: 4.1 MMOL/L (ref 3.5–5.3)
PROT SERPL-MCNC: 7.1 G/DL (ref 6.4–8.4)
RBC # BLD AUTO: 4.58 MILLION/UL (ref 3.81–5.12)
SODIUM SERPL-SCNC: 143 MMOL/L (ref 135–147)
TIBC SERPL-MCNC: 506 UG/DL (ref 250–450)
TSH SERPL DL<=0.05 MIU/L-ACNC: 1.03 UIU/ML (ref 0.45–4.5)
WBC # BLD AUTO: 6.73 THOUSAND/UL (ref 4.31–10.16)

## 2023-07-08 PROCEDURE — 85025 COMPLETE CBC W/AUTO DIFF WBC: CPT

## 2023-07-08 PROCEDURE — 80053 COMPREHEN METABOLIC PANEL: CPT

## 2023-07-08 PROCEDURE — 84443 ASSAY THYROID STIM HORMONE: CPT

## 2023-07-08 PROCEDURE — 82728 ASSAY OF FERRITIN: CPT

## 2023-07-08 PROCEDURE — 83540 ASSAY OF IRON: CPT

## 2023-07-08 PROCEDURE — 83550 IRON BINDING TEST: CPT

## 2023-07-08 PROCEDURE — 36415 COLL VENOUS BLD VENIPUNCTURE: CPT

## 2023-07-15 PROBLEM — Z11.3 SCREENING FOR STDS (SEXUALLY TRANSMITTED DISEASES): Status: RESOLVED | Noted: 2023-05-16 | Resolved: 2023-07-15

## 2023-07-19 ENCOUNTER — TELEPHONE (OUTPATIENT)
Dept: OBGYN CLINIC | Facility: CLINIC | Age: 18
End: 2023-07-19

## 2023-07-19 NOTE — TELEPHONE ENCOUNTER
----- [facility] 7[facility]19/2023  1:46 PM EDT -----  Regarding: referral !  Contact: [phone]  Hi Rodger Snellen,  Regarding our last visit I intended I contact the plastic surgeon you referred, however I don't have the contact information to call on the referral papers. If you could give me that that would be great!   Thank you,   Polly William

## 2023-08-04 ENCOUNTER — OFFICE VISIT (OUTPATIENT)
Dept: INTERNAL MEDICINE CLINIC | Facility: CLINIC | Age: 18
End: 2023-08-04
Payer: COMMERCIAL

## 2023-08-04 VITALS
HEART RATE: 100 BPM | RESPIRATION RATE: 16 BRPM | WEIGHT: 115.8 LBS | OXYGEN SATURATION: 98 % | SYSTOLIC BLOOD PRESSURE: 98 MMHG | DIASTOLIC BLOOD PRESSURE: 58 MMHG | HEIGHT: 63 IN | BODY MASS INDEX: 20.52 KG/M2

## 2023-08-04 DIAGNOSIS — F17.298 OTHER TOBACCO PRODUCT NICOTINE DEPENDENCE WITH OTHER NICOTINE-INDUCED DISORDER: ICD-10-CM

## 2023-08-04 DIAGNOSIS — F41.9 ANXIETY: Primary | ICD-10-CM

## 2023-08-04 PROCEDURE — 99214 OFFICE O/P EST MOD 30 MIN: CPT | Performed by: INTERNAL MEDICINE

## 2023-08-04 NOTE — PROGRESS NOTES
Assessment/Plan:     She is here to check in and let me know that she recently restarted the Zoloft which I think is a good idea. She reports irritability off of it. We also discussed her birth control, she is interested in Nexplanon implant which I think would be a good idea she will reach out to GYN. Otherwise denies any issues. Still smoking. Quality Measures:     Depression Screening and Follow-up Plan: Patient was screened for depression during today's encounter. They screened negative with a PHQ-2 score of 0. Tobacco Cessation Counseling: Tobacco cessation counseling was provided. The patient is sincerely urged to quit consumption of tobacco. She is ready to quit tobacco.          Return for Next scheduled follow up. No problem-specific Assessment & Plan notes found for this encounter. Diagnoses and all orders for this visit:    Anxiety  -     sertraline (ZOLOFT) 50 mg tablet; Take 1 tablet (50 mg total) by mouth daily    Other tobacco product nicotine dependence with other nicotine-induced disorder          Subjective:      Patient ID: Chuck Negrete is a 19 y.o. female. Here with acute complaints.       ALLERGIES:  No Known Allergies    CURRENT MEDICATIONS:    Current Outpatient Medications:   •  norgestimate-ethinyl estradiol (ORTHO TRI-CYCLEN,TRINESSA) 0.18/0.215/0.25 MG-35 MCG per tablet, Take 1 tablet by mouth daily, Disp: 84 tablet, Rfl: 4  •  sertraline (ZOLOFT) 50 mg tablet, Take 1 tablet (50 mg total) by mouth daily, Disp: 90 tablet, Rfl: 3    ACTIVE PROBLEM LIST:  Patient Active Problem List   Diagnosis   • Anxiety   • Vitamin D deficiency   • Surveillance of contraceptive pill       PAST MEDICAL HISTORY:  Past Medical History:   Diagnosis Date   • Anxiety    • Candida infection of genital region 6/17/2020   • Tonsillitis        PAST SURGICAL HISTORY:  Past Surgical History:   Procedure Laterality Date   • NO PAST SURGERIES     • WISDOM TOOTH EXTRACTION         FAMILY HISTORY:  Family History   Problem Relation Age of Onset   • Anxiety disorder Mother    • ADD / ADHD Mother    • Alcohol abuse Father    • ADD / ADHD Sister    • Anxiety disorder Sister    • Breast cancer Neg Hx    • Colon cancer Neg Hx    • Ovarian cancer Neg Hx    • Uterine cancer Neg Hx    • Cervical cancer Neg Hx        SOCIAL HISTORY:  Social History     Socioeconomic History   • Marital status: Single     Spouse name: Not on file   • Number of children: Not on file   • Years of education: Not on file   • Highest education level: Not on file   Occupational History   • Not on file   Tobacco Use   • Smoking status: Every Day     Types: E-Cigarettes   • Smokeless tobacco: Never   • Tobacco comments:     Vape/electronic cigarette only. Vaping Use   • Vaping Use: Every day   • Substances: Nicotine, Flavoring   Substance and Sexual Activity   • Alcohol use: Never   • Drug use: No   • Sexual activity: Yes     Partners: Male     Birth control/protection: OCP   Other Topics Concern   • Not on file   Social History Narrative    Smoke and CO detectors are present in the home    Rippld January 2021. Working at [employer] for now. Lives with father; younger sister lives with mother     Social Determinants of Health     Financial Resource Strain: Not on file   Food Insecurity: Not on file   Transportation Needs: Not on file   Physical Activity: Not on file   Stress: Not on file   Social Connections: Not on file   Intimate Partner Violence: Not on file   Housing Stability: Not on file       Review of Systems   Constitutional: Negative for chills and fever. HENT: Negative for ear pain and sore throat. Eyes: Negative for pain and visual disturbance. Respiratory: Negative for cough and shortness of breath. Cardiovascular: Negative for chest pain and palpitations. Gastrointestinal: Negative for abdominal pain and vomiting. Genitourinary: Positive for menstrual problem.  Negative for dysuria and hematuria. Musculoskeletal: Negative for arthralgias and back pain. Skin: Negative for color change and rash. Neurological: Negative for seizures and syncope. Psychiatric/Behavioral: Positive for decreased concentration and dysphoric mood. The patient is nervous/anxious. All other systems reviewed and are negative. Objective:  Vitals:    08/04/23 0933   BP: 98/58   BP Location: Left arm   Patient Position: Sitting   Cuff Size: Adult   Pulse: 100   Resp: 16   SpO2: 98%   Weight: 52.5 kg (115 lb 12.8 oz)   Height: 5' 3" (1.6 m)     Body mass index is 20.51 kg/m². Physical Exam  Vitals and nursing note reviewed. Constitutional:       Appearance: Normal appearance. She is normal weight. HENT:      Head: Normocephalic and atraumatic. Cardiovascular:      Rate and Rhythm: Normal rate. Pulmonary:      Effort: Pulmonary effort is normal.   Musculoskeletal:         General: Normal range of motion. Cervical back: Normal range of motion. Right lower leg: No edema. Left lower leg: No edema. Skin:     General: Skin is warm and dry. Neurological:      General: No focal deficit present. Mental Status: She is alert and oriented to person, place, and time. Mental status is at baseline. Psychiatric:         Mood and Affect: Mood normal.           RESULTS:    In chart    This note was created with voice recognition software. Phonic, grammatical and spelling errors may be present within the note as a result.

## 2023-08-28 ENCOUNTER — CONSULT (OUTPATIENT)
Dept: PLASTIC SURGERY | Facility: CLINIC | Age: 18
End: 2023-08-28
Payer: COMMERCIAL

## 2023-08-28 VITALS
HEIGHT: 63 IN | HEART RATE: 94 BPM | WEIGHT: 120 LBS | SYSTOLIC BLOOD PRESSURE: 128 MMHG | BODY MASS INDEX: 21.26 KG/M2 | TEMPERATURE: 97.6 F | DIASTOLIC BLOOD PRESSURE: 78 MMHG | OXYGEN SATURATION: 99 %

## 2023-08-28 DIAGNOSIS — L90.5 SCAR: Primary | ICD-10-CM

## 2023-08-28 DIAGNOSIS — L91.0 KELOID SCAR: ICD-10-CM

## 2023-08-28 PROCEDURE — 99204 OFFICE O/P NEW MOD 45 MIN: CPT | Performed by: PHYSICIAN ASSISTANT

## 2023-08-28 NOTE — PROGRESS NOTES
Subjective:    Patient ID: Yosvany Reyna is a 19 y.o. female. HPI The patient presents today for evaluation of chest scarring. She states about one year ago, she had two dermal piercings removed from the midline of her chest. She is unhappy with the appearance of the scars and wishes to have them removed. She does report moderate sun exposure in that area in the last year. She denies any pain, drainage, or wounds. The patient's past medical history significant for anxiety. She denies taking any steroids or blood thinners. The patient does not have any medication allergies. She is an active every day smoker. Past Medical History:   Diagnosis Date   • Anxiety    • Candida infection of genital region 6/17/2020   • Tonsillitis        Patient Active Problem List   Diagnosis   • Anxiety   • Vitamin D deficiency   • Surveillance of contraceptive pill         Current Outpatient Medications:   •  norgestimate-ethinyl estradiol (ORTHO TRI-CYCLEN,TRINESSA) 0.18/0.215/0.25 MG-35 MCG per tablet, Take 1 tablet by mouth daily, Disp: 84 tablet, Rfl: 4  •  sertraline (ZOLOFT) 50 mg tablet, Take 1 tablet (50 mg total) by mouth daily, Disp: 90 tablet, Rfl: 3    Past Surgical History:   Procedure Laterality Date   • NO PAST SURGERIES     • WISDOM TOOTH EXTRACTION         Social History     Tobacco Use   • Smoking status: Every Day     Types: E-Cigarettes     Passive exposure: Current   • Smokeless tobacco: Never   • Tobacco comments:     Vape/electronic cigarette only. Substance Use Topics   • Alcohol use: Never       Review of Systems    Per HPI    Objective:  /78   Pulse 94   Temp 97.6 °F (36.4 °C)   Ht 5' 3" (1.6 m)   Wt 54.4 kg (120 lb)   SpO2 99%   BMI 21.26 kg/m²    Physical Exam    General: NAD, alert  Skin: (2) 2mm x 2mm scars of the midline of the chest. Scars are lighter in color than surrounding skin, very slightly raised. There is no drainage or surrounding erythema.       Assessment/Plan: 18 year old female with scarring of the chest s/p removal of dermal piercings         Discussed options for scar treatment with the patient. We discussed steroid injections, I advised the patient that this is typically done once monthly for up to 3 months to lessen the appearance of the scar. I advised the patient that the steroid injection would likely decrease the raised appearance, however, this would not completely resolve the scarring. We also discussed the option of a scar revision which I personally recommended against as the scar would be more significant and noticeable than the current. Advised the patient I would avoid making an incision in the midline of her chest as this would result in even more unfavorable scarring. I also discussed with the patient the options of possible laser treatments, however, I advised the patient we do not have lasers for scar treatments in our office. The patient states that she will weigh her options and get in touch with us again if she is interested in pursuing steroid injections. All of her questions were answered to her satisfaction.          Jessica Sanchez PA-C  Plastic & Reconstructive Surgery

## 2024-01-10 ENCOUNTER — OFFICE VISIT (OUTPATIENT)
Dept: INTERNAL MEDICINE CLINIC | Facility: CLINIC | Age: 19
End: 2024-01-10
Payer: COMMERCIAL

## 2024-01-10 ENCOUNTER — APPOINTMENT (OUTPATIENT)
Age: 19
End: 2024-01-10
Payer: COMMERCIAL

## 2024-01-10 VITALS
HEART RATE: 101 BPM | WEIGHT: 116 LBS | DIASTOLIC BLOOD PRESSURE: 64 MMHG | HEIGHT: 63 IN | SYSTOLIC BLOOD PRESSURE: 102 MMHG | BODY MASS INDEX: 20.55 KG/M2 | OXYGEN SATURATION: 97 %

## 2024-01-10 DIAGNOSIS — Z72.51 SEXUAL BEHAVIOR WITH HIGH RISK OF EXPOSURE TO COMMUNICABLE DISEASE: ICD-10-CM

## 2024-01-10 DIAGNOSIS — F41.9 ANXIETY: ICD-10-CM

## 2024-01-10 DIAGNOSIS — N92.6 IRREGULAR PERIODS/MENSTRUAL CYCLES: ICD-10-CM

## 2024-01-10 DIAGNOSIS — B37.9 YEAST INFECTION: ICD-10-CM

## 2024-01-10 DIAGNOSIS — Z00.00 ANNUAL PHYSICAL EXAM: Primary | ICD-10-CM

## 2024-01-10 DIAGNOSIS — G47.9 SLEEP DIFFICULTIES: ICD-10-CM

## 2024-01-10 DIAGNOSIS — F17.290 NICOTINE DEPENDENCE DUE TO VAPING TOBACCO PRODUCT: ICD-10-CM

## 2024-01-10 DIAGNOSIS — Z72.51 SEXUAL BEHAVIOR WITH HIGH RISK OF EXPOSURE TO COMMUNICABLE DISEASE: Primary | ICD-10-CM

## 2024-01-10 DIAGNOSIS — Z20.9 SEXUAL BEHAVIOR WITH HIGH RISK OF EXPOSURE TO COMMUNICABLE DISEASE: ICD-10-CM

## 2024-01-10 DIAGNOSIS — Z20.9 SEXUAL BEHAVIOR WITH HIGH RISK OF EXPOSURE TO COMMUNICABLE DISEASE: Primary | ICD-10-CM

## 2024-01-10 LAB
AMORPH URATE CRY URNS QL MICRO: NORMAL
BACTERIA UR QL AUTO: NORMAL /HPF
BILIRUB UR QL STRIP: NEGATIVE
CLARITY UR: CLEAR
COLOR UR: ABNORMAL
GLUCOSE UR STRIP-MCNC: NEGATIVE MG/DL
HGB UR QL STRIP.AUTO: ABNORMAL
KETONES UR STRIP-MCNC: NEGATIVE MG/DL
LEUKOCYTE ESTERASE UR QL STRIP: NEGATIVE
NITRITE UR QL STRIP: NEGATIVE
NON-SQ EPI CELLS URNS QL MICRO: NORMAL /HPF
PH UR STRIP.AUTO: 7 [PH]
PROT UR STRIP-MCNC: NEGATIVE MG/DL
RBC #/AREA URNS AUTO: NORMAL /HPF
SP GR UR STRIP.AUTO: 1.02 (ref 1–1.03)
UROBILINOGEN UR STRIP-ACNC: <2 MG/DL
WBC #/AREA URNS AUTO: NORMAL /HPF

## 2024-01-10 PROCEDURE — 87536 HIV-1 QUANT&REVRSE TRNSCRPJ: CPT

## 2024-01-10 PROCEDURE — 36415 COLL VENOUS BLD VENIPUNCTURE: CPT

## 2024-01-10 PROCEDURE — 87591 N.GONORRHOEAE DNA AMP PROB: CPT

## 2024-01-10 PROCEDURE — 87491 CHLMYD TRACH DNA AMP PROBE: CPT

## 2024-01-10 PROCEDURE — 84703 CHORIONIC GONADOTROPIN ASSAY: CPT

## 2024-01-10 PROCEDURE — 99395 PREV VISIT EST AGE 18-39: CPT | Performed by: INTERNAL MEDICINE

## 2024-01-10 PROCEDURE — 81001 URINALYSIS AUTO W/SCOPE: CPT

## 2024-01-10 PROCEDURE — 86780 TREPONEMA PALLIDUM: CPT

## 2024-01-10 PROCEDURE — 87661 TRICHOMONAS VAGINALIS AMPLIF: CPT

## 2024-01-10 PROCEDURE — 99214 OFFICE O/P EST MOD 30 MIN: CPT | Performed by: INTERNAL MEDICINE

## 2024-01-10 RX ORDER — BUPROPION HYDROCHLORIDE 150 MG/1
150 TABLET ORAL EVERY MORNING
Qty: 30 TABLET | Refills: 5 | Status: SHIPPED | OUTPATIENT
Start: 2024-01-10 | End: 2024-07-08

## 2024-01-10 RX ORDER — FLUCONAZOLE 150 MG/1
150 TABLET ORAL ONCE
Qty: 1 TABLET | Refills: 3 | Status: SHIPPED | OUTPATIENT
Start: 2024-01-10 | End: 2024-01-10

## 2024-01-10 NOTE — PROGRESS NOTES
"Assessment/Plan:     Here for annual physical exam which has been completed today, will be graduating college soon with degree in business, working at [employer], in a relationship; will be due for cervical cancer screening this year.    Discussed her anxiety and depression, hope to stop Zoloft completely, will add Wellbutrin and she will come back in 1 month, then if going well, stop Zoloft after careful weaning of drug, says she feels \"like she is on auto  on it\", and no sex drive.'    She would like annual STD screening, abnormal periods noted; preg test, gonorrhea/chlamydia, HIV, trich, RPR ordered. Discussed vaping and quitting.    Abnormal sleeping routine, falls asleep mid sentence, fatigue; see sleep medicine. She does work nights.     Quality Measures:       Depression Screening and Follow-up Plan: Clincally patient does not have depression. No treatment is required.     Tobacco Cessation Counseling: Tobacco cessation counseling was provided. The patient is sincerely urged to quit consumption of tobacco. She is not ready to quit tobacco.          Return in about 4 weeks (around 2/7/2024).    No problem-specific Assessment & Plan notes found for this encounter.       Diagnoses and all orders for this visit:    Annual physical exam    Yeast infection  -     fluconazole (DIFLUCAN) 150 mg tablet; Take 1 tablet (150 mg total) by mouth once for 1 dose    Sexual behavior with high risk of exposure to communicable disease  -     Pregnancy Test (HCG Qualitative); Future  -     TRICHOMONAS VAGINALIS (TV), TMA; Future  -     UA w Reflex to Microscopic w Reflex to Culture -Lab Collect; Future  -     HIV-1 RNA, Quantitative PCR, [facility]; Future  -     RPR (DX) W/REFL TITER AND CONFIRM TESTING (REFL); Future  -     Chlamydia/GC amplified DNA by PCR; Future    Irregular periods/menstrual cycles    Nicotine dependence due to vaping tobacco product    Sleep difficulties  -     Ambulatory Referral to Sleep Medicine; " Future    Anxiety  -     buPROPion (WELLBUTRIN XL) 150 mg 24 hr tablet; Take 1 tablet (150 mg total) by mouth every morning          Subjective:      Patient ID: Satya Bridges is a 18 y.o. female.    Here for her annual physical exam.        ALLERGIES:  No Known Allergies    CURRENT MEDICATIONS:    Current Outpatient Medications:     buPROPion (WELLBUTRIN XL) 150 mg 24 hr tablet, Take 1 tablet (150 mg total) by mouth every morning, Disp: 30 tablet, Rfl: 5    fluconazole (DIFLUCAN) 150 mg tablet, Take 1 tablet (150 mg total) by mouth once for 1 dose, Disp: 1 tablet, Rfl: 3    norgestimate-ethinyl estradiol (ORTHO TRI-CYCLEN,TRINESSA) 0.18/0.215/0.25 MG-35 MCG per tablet, Take 1 tablet by mouth daily, Disp: 84 tablet, Rfl: 4    sertraline (ZOLOFT) 50 mg tablet, Take 1 tablet (50 mg total) by mouth daily, Disp: 90 tablet, Rfl: 3    ACTIVE PROBLEM LIST:  Patient Active Problem List   Diagnosis    Anxiety    Vitamin D deficiency    Surveillance of contraceptive pill       PAST MEDICAL HISTORY:  Past Medical History:   Diagnosis Date    Anxiety     Candida infection of genital region 6/17/2020    Tonsillitis        PAST SURGICAL HISTORY:  Past Surgical History:   Procedure Laterality Date    NO PAST SURGERIES      WISDOM TOOTH EXTRACTION         FAMILY HISTORY:  Family History   Problem Relation Age of Onset    Anxiety disorder Mother     ADD / ADHD Mother     Alcohol abuse Father     ADD / ADHD Sister     Anxiety disorder Sister     Breast cancer Neg Hx     Colon cancer Neg Hx     Ovarian cancer Neg Hx     Uterine cancer Neg Hx     Cervical cancer Neg Hx        SOCIAL HISTORY:  Social History     Socioeconomic History    Marital status: Single     Spouse name: Not on file    Number of children: Not on file    Years of education: Not on file    Highest education level: Not on file   Occupational History    Not on file   Tobacco Use    Smoking status: Every Day     Types: E-Cigarettes     Passive exposure: Current     "Smokeless tobacco: Never    Tobacco comments:     Vape/electronic cigarette only.   Vaping Use    Vaping status: Every Day    Substances: Nicotine, Flavoring   Substance and Sexual Activity    Alcohol use: Never    Drug use: No    Sexual activity: Yes     Partners: Male     Birth control/protection: OCP   Other Topics Concern    Not on file   Social History Narrative    Smoke and CO detectors are present in the home    High School graduate January 2021. Working at [employer] for now.     Lives with father; younger sister lives with mother     Social Determinants of Health     Financial Resource Strain: Not on file   Food Insecurity: Not on file   Transportation Needs: Not on file   Physical Activity: Not on file   Stress: Not on file   Social Connections: Not on file   Intimate Partner Violence: Not on file   Housing Stability: Not on file       Review of Systems   Constitutional:  Negative for chills and fever.   HENT:  Negative for ear pain and sore throat.    Eyes:  Negative for pain and visual disturbance.   Respiratory:  Negative for cough and shortness of breath.    Cardiovascular:  Negative for chest pain and palpitations.   Gastrointestinal:  Negative for abdominal pain and vomiting.   Genitourinary:  Positive for menstrual problem. Negative for dysuria and hematuria.   Musculoskeletal:  Negative for arthralgias and back pain.   Skin:  Negative for color change and rash.   Neurological:  Negative for seizures and syncope.   Psychiatric/Behavioral:  Positive for sleep disturbance. The patient is nervous/anxious.    All other systems reviewed and are negative.        Objective:  Vitals:    01/10/24 1527   BP: 102/64   BP Location: Left arm   Patient Position: Sitting   Cuff Size: Standard   Pulse: 101   SpO2: 97%   Weight: 52.6 kg (116 lb)   Height: 5' 3\" (1.6 m)     Body mass index is 20.55 kg/m².     Physical Exam  Vitals and nursing note reviewed.   Constitutional:       Appearance: Normal appearance. She is " normal weight.   HENT:      Head: Normocephalic and atraumatic.   Cardiovascular:      Rate and Rhythm: Normal rate and regular rhythm.      Heart sounds: Normal heart sounds.   Pulmonary:      Effort: Pulmonary effort is normal.      Breath sounds: Normal breath sounds.   Musculoskeletal:         General: Normal range of motion.      Cervical back: Normal range of motion.      Right lower leg: No edema.      Left lower leg: No edema.   Skin:     General: Skin is warm and dry.   Neurological:      General: No focal deficit present.      Mental Status: She is alert and oriented to person, place, and time. Mental status is at baseline.   Psychiatric:         Mood and Affect: Mood normal.           RESULTS:  Cholesterol   Date/Time Value Ref Range Status   12/27/2021 08:24  See Comment mg/dL Final     Comment:     Cholesterol:         Pediatric <18 Years        Desirable          <170 mg/dL      Borderline High    170-199 mg/dL      High               >=200 mg/dL        Adult >=18 Years            Desirable        <200 mg/dL      Borderline High  200-239 mg/dL      High             >239 mg/dL       Triglycerides   Date/Time Value Ref Range Status   12/27/2021 08:24 AM 54 See Comment mg/dL Final     Comment:     Triglyceride:     0-9Y            <75mg/dL     10Y-17Y         <90 mg/dL       >=18Y     Normal          <150 mg/dL     Borderline High 150-199 mg/dL     High            200-499 mg/dL        Very High       >499 mg/dL    Specimen collection should occur prior to N-Acetylcysteine or Metamizole administration due to the potential for falsely depressed results.     HDL, Direct   Date/Time Value Ref Range Status   12/27/2021 08:24 AM 63 >=50 mg/dL Final     Comment:     Specimen collection should occur prior to Metamizole administration due to the potential for falsley depressed results.     LDL Calculated   Date/Time Value Ref Range Status   12/27/2021 08:24 AM 82 0 - 100 mg/dL Final     Comment:     LDL  Cholesterol:     Optimal           <100 mg/dl     Near Optimal      100-129 mg/dl     Above Optimal       Borderline High 130-159 mg/dl       High            160-189 mg/dl       Very High       >189 mg/dl         This screening LDL is a calculated result.   It does not have the accuracy of the Direct Measured LDL in the monitoring of patients with hyperlipidemia and/or statin therapy.   Direct Measure LDL (IHH955) must be ordered separately in these patients.     Hemoglobin   Date/Time Value Ref Range Status   07/08/2023 09:38 AM 14.4 11.5 - 15.4 g/dL Final     Hematocrit   Date/Time Value Ref Range Status   07/08/2023 09:38 AM 42.2 34.8 - 46.1 % Final     Platelets   Date/Time Value Ref Range Status   07/08/2023 09:38  149 - 390 Thousands/uL Final     TSH 3RD GENERATON   Date/Time Value Ref Range Status   07/08/2023 09:38 AM 1.026 0.450 - 4.500 uIU/mL Final     Comment:     The recommended reference ranges for TSH during pregnancy are as follows:   First trimester 0.1 to 2.5 uIU/mL   Second trimester  0.2 to 3.0 uIU/mL   Third trimester 0.3 to 3.0 uIU/m    Note: Normal ranges may not apply to patients who are transgender, non-binary, or whose legal sex, sex at birth, and gender identity differ.  Adult TSH (3rd generation) reference range follows the recommended guidelines of the American Thyroid Association, January, 2020.     Sodium   Date/Time Value Ref Range Status   07/08/2023 09:38  135 - 147 mmol/L Final     BUN   Date/Time Value Ref Range Status   07/08/2023 09:38 AM 9 5 - 25 mg/dL Final     Creatinine   Date/Time Value Ref Range Status   07/08/2023 09:38 AM 0.75 0.60 - 1.30 mg/dL Final     Comment:     Standardized to IDMS reference method      In chart    This note was created with voice recognition software.  Phonic, grammatical and spelling errors may be present within the note as a result.

## 2024-01-10 NOTE — PROGRESS NOTES
ADULT ANNUAL PHYSICAL  [facility] INTERNAL MEDICINE [place]    NAME: Satya Bridges  AGE: 18 y.o. SEX: female  : 2005     DATE: 1/10/2024     Assessment and Plan:     Problem List Items Addressed This Visit    None  Visit Diagnoses       Annual physical exam    -  Primary    Yeast infection        Relevant Medications    fluconazole (DIFLUCAN) 150 mg tablet    Sexual behavior with high risk of exposure to communicable disease        Relevant Orders    Pregnancy Test (HCG Qualitative)    TRICHOMONAS VAGINALIS (TV), TMA    UA w Reflex to Microscopic w Reflex to Culture -Lab Collect    HIV-1 RNA, Quantitative PCR, [facility]    RPR (DX) W/REFL TITER AND CONFIRM TESTING (REFL)    Chlamydia/GC amplified DNA by PCR    Irregular periods/menstrual cycles        Other tobacco product nicotine dependence with other nicotine-induced disorder                Immunizations and preventive care screenings were discussed with patient today. Appropriate education was printed on patient's after visit summary.    Counseling:  Alcohol/drug use: discussed moderation in alcohol intake, the recommendations for healthy alcohol use, and avoidance of illicit drug use.  Dental Health: discussed importance of regular tooth brushing, flossing, and dental visits.  Injury prevention: discussed safety/seat belts, safety helmets, smoke detectors, carbon dioxide detectors, and smoking near bedding or upholstery.  Sexual health: discussed sexually transmitted diseases, partner selection, use of condoms, avoidance of unintended pregnancy, and contraceptive alternatives.  Exercise: the importance of regular exercise/physical activity was discussed. Recommend exercise 3-5 times per week for at least 30 minutes.       Depression Screening and Follow-up Plan: Clincally patient does not have depression. No treatment is required.     Tobacco Cessation Counseling: Tobacco cessation counseling was provided. The  patient is sincerely urged to quit consumption of tobacco. She is not ready to quit tobacco.         Return in about 1 year (around 1/10/2025) for Annual physical.     Chief Complaint:     Chief Complaint   Patient presents with    Annual Exam      History of Present Illness:     Adult Annual Physical   Patient here for a comprehensive physical exam. The patient reports no problems.    Diet and Physical Activity  Diet/Nutrition: well balanced diet.   Exercise: no formal exercise.      Depression Screening  PHQ-2/9 Depression Screening           General Health  Sleep: sleeps well.   Hearing: normal - bilateral.  Vision: no vision problems.   Dental: regular dental visits.       /GYN Health  Follows with gynecology? yes   Last menstrual period: unclear, 12/15/23  Contraceptive method: oral contraceptives.  History of STDs?: yes.     Advanced Care Planning  Do you have an advanced directive? no  Do you have a durable medical power of ? no     Review of Systems:     Review of Systems   Constitutional:  Negative for chills and fever.   HENT:  Negative for ear pain and sore throat.    Eyes:  Negative for pain and visual disturbance.   Respiratory:  Negative for cough and shortness of breath.    Cardiovascular:  Negative for chest pain and palpitations.   Gastrointestinal:  Negative for abdominal pain and vomiting.   Genitourinary:  Negative for dysuria and hematuria.   Musculoskeletal:  Negative for arthralgias and back pain.   Skin:  Negative for color change and rash.   Neurological:  Negative for seizures and syncope.   All other systems reviewed and are negative.     Past Medical History:     Past Medical History:   Diagnosis Date    Anxiety     Candida infection of genital region 6/17/2020    Tonsillitis       Past Surgical History:     Past Surgical History:   Procedure Laterality Date    NO PAST SURGERIES      WISDOM TOOTH EXTRACTION        Social History:     Social History     Socioeconomic History     Marital status: Single     Spouse name: None    Number of children: None    Years of education: None    Highest education level: None   Occupational History    None   Tobacco Use    Smoking status: Every Day     Types: E-Cigarettes     Passive exposure: Current    Smokeless tobacco: Never    Tobacco comments:     Vape/electronic cigarette only.   Vaping Use    Vaping status: Every Day    Substances: Nicotine, Flavoring   Substance and Sexual Activity    Alcohol use: Never    Drug use: No    Sexual activity: Yes     Partners: Male     Birth control/protection: OCP   Other Topics Concern    None   Social History Narrative    Smoke and CO detectors are present in the home    High School graduate January 2021. Working at [employer] for now.     Lives with father; younger sister lives with mother     Social Determinants of Health     Financial Resource Strain: Not on file   Food Insecurity: Not on file   Transportation Needs: Not on file   Physical Activity: Not on file   Stress: Not on file   Social Connections: Not on file   Intimate Partner Violence: Not on file   Housing Stability: Not on file      Family History:     Family History   Problem Relation Age of Onset    Anxiety disorder Mother     ADD / ADHD Mother     Alcohol abuse Father     ADD / ADHD Sister     Anxiety disorder Sister     Breast cancer Neg Hx     Colon cancer Neg Hx     Ovarian cancer Neg Hx     Uterine cancer Neg Hx     Cervical cancer Neg Hx       Current Medications:     Current Outpatient Medications   Medication Sig Dispense Refill    fluconazole (DIFLUCAN) 150 mg tablet Take 1 tablet (150 mg total) by mouth once for 1 dose 1 tablet 3    norgestimate-ethinyl estradiol (ORTHO TRI-CYCLEN,TRINESSA) 0.18/0.215/0.25 MG-35 MCG per tablet Take 1 tablet by mouth daily 84 tablet 4    sertraline (ZOLOFT) 50 mg tablet Take 1 tablet (50 mg total) by mouth daily 90 tablet 3     No current facility-administered medications for this visit.      Allergies:     No  "Known Allergies   Physical Exam:     /64 (BP Location: Left arm, Patient Position: Sitting, Cuff Size: Standard)   Pulse 101   Ht 5' 3\" (1.6 m)   Wt 52.6 kg (116 lb)   SpO2 97%   BMI 20.55 kg/m²     Physical Exam  Vitals and nursing note reviewed.   Constitutional:       General: She is not in acute distress.     Appearance: Normal appearance. She is well-developed and normal weight.   HENT:      Head: Normocephalic and atraumatic.   Eyes:      Conjunctiva/sclera: Conjunctivae normal.   Cardiovascular:      Rate and Rhythm: Normal rate and regular rhythm.      Heart sounds: Normal heart sounds. No murmur heard.  Pulmonary:      Effort: Pulmonary effort is normal. No respiratory distress.      Breath sounds: Normal breath sounds.   Abdominal:      General: Abdomen is flat. Bowel sounds are normal.      Palpations: Abdomen is soft.      Tenderness: There is no abdominal tenderness.   Musculoskeletal:         General: No swelling. Normal range of motion.      Cervical back: Normal range of motion and neck supple.      Right lower leg: No edema.      Left lower leg: No edema.   Lymphadenopathy:      Cervical: No cervical adenopathy.   Skin:     General: Skin is warm and dry.      Capillary Refill: Capillary refill takes less than 2 seconds.   Neurological:      General: No focal deficit present.      Mental Status: She is alert and oriented to person, place, and time. Mental status is at baseline.   Psychiatric:         Mood and Affect: Mood normal.          Pepe Aquino MD   [facility] INTERNAL MEDICINE [place]    "

## 2024-01-11 LAB
C TRACH DNA SPEC QL NAA+PROBE: NEGATIVE
HCG SERPL QL: NEGATIVE
N GONORRHOEA DNA SPEC QL NAA+PROBE: NEGATIVE
TREPONEMA PALLIDUM IGG+IGM AB [PRESENCE] IN SERUM OR PLASMA BY IMMUNOASSAY: NORMAL

## 2024-01-12 LAB — HIV1 RNA # PLAS NAA DL=20: NOT DETECTED {COPIES}/ML

## 2024-01-13 LAB — T VAGINALIS RRNA SPEC QL NAA+PROBE: NEGATIVE

## 2024-01-23 ENCOUNTER — OFFICE VISIT (OUTPATIENT)
Dept: OBGYN CLINIC | Facility: CLINIC | Age: 19
End: 2024-01-23
Payer: COMMERCIAL

## 2024-01-23 VITALS
HEIGHT: 63 IN | WEIGHT: 119 LBS | BODY MASS INDEX: 21.09 KG/M2 | DIASTOLIC BLOOD PRESSURE: 60 MMHG | HEART RATE: 104 BPM | SYSTOLIC BLOOD PRESSURE: 90 MMHG

## 2024-01-23 DIAGNOSIS — N92.1 BREAKTHROUGH BLEEDING ON BIRTH CONTROL PILLS: Primary | ICD-10-CM

## 2024-01-23 PROCEDURE — 99213 OFFICE O/P EST LOW 20 MIN: CPT

## 2024-01-23 RX ORDER — FLUCONAZOLE 150 MG/1
150 TABLET ORAL ONCE
COMMUNITY
Start: 2024-01-10

## 2024-01-23 RX ORDER — NORETHINDRONE ACETATE AND ETHINYL ESTRADIOL 1.5-30(21)
1 KIT ORAL DAILY
Qty: 84 TABLET | Refills: 0 | Status: SHIPPED | OUTPATIENT
Start: 2024-01-23

## 2024-01-23 NOTE — PATIENT INSTRUCTIONS
Oral Contraception Instructions:    You may start your pills today.      If you miss 1 pill:  Take it as soon as you remember!  You are still covered for birth control.  This may mean you take 2 tablets at the same time, which is okay.    If you miss 2 pills:  You will take 2 pills one day, and then 2 pills the next day to get caught up.  You are still covered for birth control.      If you miss 3 pills:  You now need to use barrier contraception (condoms) as your birth control pills will not be effective.  Please start a new pack of pills today.  You will likely have breakthrough bleeding.    If you have any question please call the office.    Remember, it may take up to 3 months for your body to adjust to a new birth control pill.  Breakthrough bleeding is not uncommon.     Birth Control Pills   WHAT YOU NEED TO KNOW:   What are birth control pills?  Birth control pills are also called oral contraceptives, or the pill. It is medicine that helps prevent pregnancy by stopping ovulation. Ovulation is when the ovaries make and release an egg cell each month. If this egg gets fertilized by sperm, pregnancy occurs. You will need to take the pill at the same time every day. Your healthcare provider will tell you when to start taking the pill. You will also be told what to do if you miss a dose. Instructions will depend on the kind of birth control pills you are taking.   What are the different kinds of birth control pills?  Some kinds are taken for 21 days in a row, followed by 7 days of placebo (no hormones) pills. Other kinds are taken for 24 days followed by 4 days of placebos. Each kind has a certain amount of female hormones. Your provider will decide on the kind that is best for you based on your age and other health conditions.  What may be done before I can start taking birth control pills?  You need to see your healthcare provider to get a prescription. Any of the following may be done before your healthcare  provider gives you a prescription:  Your healthcare provider will ask about diseases and illnesses you have had in the past. Your provider will check your risk for blood clots, heart conditions, or stroke. Tell your provider if you had gastric bypass surgery. This surgery can affect the way your body absorbs medicines such as birth control pills.    Your provider will also check your blood pressure, and may do a breast and pelvic exam. A Pap smear may also be done during the pelvic exam. This is a test to make sure you do not have abnormal changes on your cervix. You may need other tests, such as a urine test to make sure you are not pregnant.    Your provider will ask if you take any medicines and if you smoke. Smoking increases your risk for stroke, heart attack, or a blood clot in your lungs. If you smoke, you should not take certain kinds of birth control pills.    What are the advantages of birth control pills?  When birth control pills are used correctly, the chances of getting pregnant are very low. Birth control pills may help decrease bleeding and pain during your monthly period. They may also help prevent cancer of the uterus and ovaries.  What are the disadvantages of birth control pills?  You may have sudden changes in your mood or feelings while you take birth control pills. You may have nausea and a decreased sex drive. You may have an increased appetite and rapid weight gain. You may also have bleeding in between periods, less frequent periods, vaginal dryness, and breast pain. Birth control pills will not protect you from sexually transmitted infections. Rarely, some birth control pills can increase your risk for a blood clot. This may become life-threatening.  What should I do if I decide I want to get pregnant?  If you are planning to have a baby, ask your healthcare provider when you may stop taking your birth control pills. It may take some time for you to start ovulating again. Ask your  healthcare provider for more information about pregnancy after birth control pills.  When should I start taking birth control pills after I have a baby?  If you are not breastfeeding, you may start taking birth control pills 3 weeks after you give birth. You may be able to take certain types of birth control pills if you are breastfeeding. These pills can be started from 6 weeks to 6 months after you give birth. Ask your healthcare provider for more information about when to start taking birth control pills after you give birth.  What do I need to know about birth control pills and menopause?   Talk with your healthcare provider if you want to take birth control pills around menopause.     Around age 45, you will enter into perimenopause. This means your hormone levels are dropping and you are ovulating less often. You can still become pregnant during this time. The risk for problems, such as miscarriage, are higher if you become pregnant after age 45. Birth control pills will prevent pregnancy, and may also help prevent or relieve some signs and symptoms of menopause. Examples are hot flashes and mood swings.     Your provider will do tests when you are around age 50. The tests may show that you are in menopause. If the tests do not show menopause for sure, you may be able to continue taking the pill up to age 55. The decision will depend on your health and if you have any medical conditions, such as a blood clot.    Call your local emergency number (911 in the [place]) for any of the following:   You have any of the following signs of a stroke:      Numbness or drooping on one side of your face     Weakness in an arm or leg    Confusion or difficulty speaking    Dizziness, a severe headache, or vision loss    You feel lightheaded, short of breath, and have chest pain.    You cough up blood.    When should I seek immediate care?   Your arm or leg feels warm, tender, and painful. It may look swollen and red.    You have  severe pain, numbness, or swelling in your arms or legs.    When should I call my doctor?   You have forgotten to take a birth control pill.    You have mood changes, such as depression, since starting birth control pills.    You have nausea or are vomiting.    You have severe abdominal pain.    You missed a period and have questions or concerns about being pregnant.    You still have bleeding 4 months after taking birth control pills correctly.    You have questions or concerns about your condition or care.    CARE AGREEMENT:   You have the right to help plan your care. Learn about your health condition and how it may be treated. Discuss treatment options with your healthcare providers to decide what care you want to receive. You always have the right to refuse treatment. The above information is an  only. It is not intended as medical advice for individual conditions or treatments. Talk to your doctor, nurse or pharmacist before following any medical regimen to see if it is safe and effective for you.  © Copyright [employer] 2023 Information is for End User's use only and may not be sold, redistributed or otherwise used for commercial purposes.

## 2024-01-23 NOTE — PROGRESS NOTES
Assessment/Plan:    Breakthrough bleeding on birth control pills  Discussed alternative OCP vs alternative BCM. Reviewed insertion/removal process, risks, benefits and potential SE of LNG-IUD and Cu-IUD. Written information provided.     Satya was seen today for consult.    Diagnoses and all orders for this visit:    Breakthrough bleeding on birth control pills  -     norethindrone-ethinyl estradiol-iron (Junel FE 1.5/30) 1.5-30 MG-MCG tablet; Take 1 tablet by mouth daily         Subjective:      Patient ID: Satya Bridges is a 19 y.o. female here for irregular menses and BTB on OCPs. Patient states this happens every couple of months since she started taking birth control. She reports bleeding off her placebo pills for several months and almost daily spotting. She denies any missed pills. Denies any other issues with her pills. She is interested in possible IUD but would like to try another pill first. She is sexually active.   Patient's last menstrual period was 01/01/2024 (exact date).      The following portions of the patient's history were reviewed and updated as appropriate: She  has a past medical history of Anxiety, Candida infection of genital region (6/17/2020), and Tonsillitis.  She   Patient Active Problem List    Diagnosis Date Noted    Breakthrough bleeding on birth control pills 01/23/2024    Surveillance of contraceptive pill 06/12/2019    Vitamin D deficiency 07/09/2018    Anxiety 09/18/2017     She  has a past surgical history that includes No past surgeries and [place] tooth extraction.  Her family history includes ADD / ADHD in her mother and sister; Alcohol abuse in her father; Anxiety disorder in her mother and sister.  She  reports that she has been smoking e-cigarettes. She has been exposed to tobacco smoke. She has never used smokeless tobacco. She reports current alcohol use. She reports that she does not use drugs.  Current Outpatient Medications   Medication Sig Dispense Refill     "buPROPion (WELLBUTRIN XL) 150 mg 24 hr tablet Take 1 tablet (150 mg total) by mouth every morning 30 tablet 5    fluconazole (DIFLUCAN) 150 mg tablet Take 150 mg by mouth once      norethindrone-ethinyl estradiol-iron (Junel FE 1.5/30) 1.5-30 MG-MCG tablet Take 1 tablet by mouth daily 84 tablet 0    sertraline (ZOLOFT) 50 mg tablet Take 1 tablet (50 mg total) by mouth daily 90 tablet 3     No current facility-administered medications for this visit.     She has No Known Allergies..    Review of Systems   Constitutional:  Negative for chills and fever.   Respiratory:  Negative for shortness of breath.    Cardiovascular:  Negative for chest pain.   Gastrointestinal:  Negative for abdominal pain and nausea.   Genitourinary:  Positive for menstrual problem. Negative for dyspareunia, pelvic pain, vaginal discharge and vaginal pain.   Musculoskeletal:  Negative for back pain.   Skin:  Negative for rash.   Neurological:  Negative for light-headedness and headaches.   Hematological:  Negative for adenopathy.   Psychiatric/Behavioral:  Negative for confusion.          Objective:      BP 90/60 (BP Location: Left arm, Patient Position: Sitting)   Pulse 104   Ht 5' 3\" (1.6 m)   Wt 54 kg (119 lb)   LMP 01/01/2024 (Exact Date)   BMI 21.08 kg/m²          Physical Exam  Vitals and nursing note reviewed.   Constitutional:       General: She is not in acute distress.     Appearance: Normal appearance. She is not ill-appearing.   HENT:      Head: Normocephalic and atraumatic.   Eyes:      Conjunctiva/sclera: Conjunctivae normal.   Pulmonary:      Effort: Pulmonary effort is normal.   Abdominal:      Palpations: Abdomen is soft.      Tenderness: There is no abdominal tenderness.   Musculoskeletal:         General: Normal range of motion.      Cervical back: Neck supple.   Skin:     General: Skin is warm and dry.   Neurological:      General: No focal deficit present.      Mental Status: She is alert.   Psychiatric:         Mood " and Affect: Mood normal.         Behavior: Behavior normal.

## 2024-01-23 NOTE — ASSESSMENT & PLAN NOTE
Discussed alternative OCP vs alternative BCM. Reviewed insertion/removal process, risks, benefits and potential SE of LNG-IUD and Cu-IUD. Written information provided.

## 2024-02-01 DIAGNOSIS — F41.9 ANXIETY: ICD-10-CM

## 2024-02-01 RX ORDER — BUPROPION HYDROCHLORIDE 150 MG/1
150 TABLET ORAL EVERY MORNING
Qty: 90 TABLET | Refills: 1 | Status: SHIPPED | OUTPATIENT
Start: 2024-02-01

## 2024-02-13 ENCOUNTER — TELEMEDICINE (OUTPATIENT)
Dept: INTERNAL MEDICINE CLINIC | Facility: CLINIC | Age: 19
End: 2024-02-13
Payer: COMMERCIAL

## 2024-02-13 VITALS — BODY MASS INDEX: 21.09 KG/M2 | HEIGHT: 63 IN | WEIGHT: 119 LBS

## 2024-02-13 DIAGNOSIS — F41.9 ANXIETY: ICD-10-CM

## 2024-02-13 PROCEDURE — 99213 OFFICE O/P EST LOW 20 MIN: CPT | Performed by: INTERNAL MEDICINE

## 2024-02-13 NOTE — PROGRESS NOTES
Virtual Regular Visit    Verification of patient location:    Patient is located at Home in the following state in which I hold an active license [place]      Assessment/Plan:    Problem List Items Addressed This Visit       Anxiety    Relevant Medications    sertraline (ZOLOFT) 50 mg tablet            Reason for visit is   Chief Complaint   Patient presents with    Follow-up    Virtual Regular Visit          Encounter provider Pepe Aquino MD    Provider located at [facility] [place]  [id number]  [place] [address] [phone]      Recent Visits  No visits were found meeting these conditions.  Showing recent visits within past 7 days and meeting all other requirements  Today's Visits  Date Type Provider Dept   02/13/24 Telemedicine Pepe Aquino MD  Internal [facility]   Showing today's visits and meeting all other requirements  Future Appointments  No visits were found meeting these conditions.  Showing future appointments within next 150 days and meeting all other requirements       The patient was identified by name and date of birth. Satya Bridges was informed that this is a telemedicine visit and that the visit is being conducted through the Epic Embedded platform. She agrees to proceed..  My office door was closed. No one else was in the room.  She acknowledged consent and understanding of privacy and security of the video platform. The patient has agreed to participate and understands they can discontinue the visit at any time.    Patient is aware this is a billable service.     Subjective  Satya Bridges is a 19 y.o. female  .      Patient is being seen virtually 2/2 inclement weather.    Wellbutrin is not working, stop it, decrease Zoloft to 25 mg, will check in 2 months.    Consider adding Buspar.         Past Medical History:   Diagnosis Date    Anxiety     Candida infection of genital region 6/17/2020    Tonsillitis   "      Past Surgical History:   Procedure Laterality Date    NO PAST SURGERIES      WISDOM TOOTH EXTRACTION         Current Outpatient Medications   Medication Sig Dispense Refill    fluconazole (DIFLUCAN) 150 mg tablet Take 150 mg by mouth once      norethindrone-ethinyl estradiol-iron (Junel FE 1.5/30) 1.5-30 MG-MCG tablet Take 1 tablet by mouth daily 84 tablet 0    sertraline (ZOLOFT) 50 mg tablet Take 0.5 tablets (25 mg total) by mouth daily       No current facility-administered medications for this visit.        No Known Allergies    Review of Systems   Constitutional:  Negative for chills and fever.   HENT:  Negative for ear pain and sore throat.    Eyes:  Negative for pain and visual disturbance.   Respiratory:  Negative for cough and shortness of breath.    Cardiovascular:  Negative for chest pain and palpitations.   Gastrointestinal:  Negative for abdominal pain and vomiting.   Genitourinary:  Negative for dysuria and hematuria.   Musculoskeletal:  Negative for arthralgias and back pain.   Skin:  Negative for color change and rash.   Neurological:  Negative for seizures and syncope.   Psychiatric/Behavioral:  The patient is nervous/anxious.    All other systems reviewed and are negative.      Video Exam    Vitals:    02/13/24 1202   Weight: 54 kg (119 lb)   Height: 5' 3\" (1.6 m)       Physical Exam  Vitals reviewed.   Neurological:      Mental Status: She is alert and oriented to person, place, and time.   Psychiatric:         Mood and Affect: Mood normal.          Visit Time  Total Visit Duration: 15 minutes        "

## 2024-04-08 DIAGNOSIS — N92.1 BREAKTHROUGH BLEEDING ON BIRTH CONTROL PILLS: ICD-10-CM

## 2024-04-09 RX ORDER — NORETHINDRONE ACETATE AND ETHINYL ESTRADIOL AND FERROUS FUMARATE 1.5-30(21)
1 KIT ORAL DAILY
Qty: 84 TABLET | Refills: 1 | Status: SHIPPED | OUTPATIENT
Start: 2024-04-09

## 2024-05-16 ENCOUNTER — OFFICE VISIT (OUTPATIENT)
Age: 19
End: 2024-05-16
Payer: COMMERCIAL

## 2024-05-16 VITALS
DIASTOLIC BLOOD PRESSURE: 64 MMHG | TEMPERATURE: 97.9 F | SYSTOLIC BLOOD PRESSURE: 110 MMHG | HEIGHT: 63 IN | RESPIRATION RATE: 18 BRPM | BODY MASS INDEX: 20.2 KG/M2 | HEART RATE: 104 BPM | WEIGHT: 114 LBS | OXYGEN SATURATION: 97 %

## 2024-05-16 DIAGNOSIS — N89.8 VAGINAL DISCHARGE: ICD-10-CM

## 2024-05-16 DIAGNOSIS — B37.31 YEAST INFECTION INVOLVING THE VAGINA AND SURROUNDING AREA: Primary | ICD-10-CM

## 2024-05-16 LAB
SL AMB  POCT GLUCOSE, UA: NEGATIVE
SL AMB LEUKOCYTE ESTERASE,UA: ABNORMAL
SL AMB POCT BILIRUBIN,UA: NEGATIVE
SL AMB POCT BLOOD,UA: ABNORMAL
SL AMB POCT CLARITY,UA: CLEAR
SL AMB POCT COLOR,UA: YELLOW
SL AMB POCT KETONES,UA: NEGATIVE
SL AMB POCT NITRITE,UA: NEGATIVE
SL AMB POCT PH,UA: 6
SL AMB POCT SPECIFIC GRAVITY,UA: 1.02
SL AMB POCT URINE PROTEIN: NEGATIVE
SL AMB POCT UROBILINOGEN: 0.2

## 2024-05-16 PROCEDURE — 87491 CHLMYD TRACH DNA AMP PROBE: CPT | Performed by: PHYSICIAN ASSISTANT

## 2024-05-16 PROCEDURE — G0382 LEV 3 HOSP TYPE B ED VISIT: HCPCS | Performed by: PHYSICIAN ASSISTANT

## 2024-05-16 PROCEDURE — 87086 URINE CULTURE/COLONY COUNT: CPT | Performed by: PHYSICIAN ASSISTANT

## 2024-05-16 PROCEDURE — 81002 URINALYSIS NONAUTO W/O SCOPE: CPT | Performed by: PHYSICIAN ASSISTANT

## 2024-05-16 PROCEDURE — 87591 N.GONORRHOEAE DNA AMP PROB: CPT | Performed by: PHYSICIAN ASSISTANT

## 2024-05-16 RX ORDER — FLUCONAZOLE 150 MG/1
150 TABLET ORAL ONCE
Qty: 1 TABLET | Refills: 2 | Status: SHIPPED | OUTPATIENT
Start: 2024-05-16 | End: 2024-05-16

## 2024-05-16 NOTE — PROGRESS NOTES
[facility]        NAME: Satya Bridges is a 19 y.o. female  : 2005    MRN: [id number]  DATE: May 16, 2024  TIME: 1:50 PM    Assessment and Plan   Yeast infection involving the vagina and surrounding area [B37.31]  1. Yeast infection involving the vagina and surrounding area  fluconazole (DIFLUCAN) 150 mg tablet      2. Vaginal discharge  Chlamydia/GC amplified DNA by PCR    POCT urine dip    Urine culture    Chlamydia/GC amplified DNA by PCR    fluconazole (DIFLUCAN) 150 mg tablet            Patient Instructions     Patient Instructions   Urine dip results discussed with the patient today. We will send it out for testing for gonorrhea and chlamydia. We will contact the patient with any positive results and if treatment is needed.     Recommended fluconazole for vaginal yeast infection.       Follow up with PCP in 3-5 days.  Proceed to  ER if symptoms worsen.    If tests are performed, our office will contact you with results only if changes need to made to the care plan discussed with you at the visit. [facility]        Chief Complaint     Chief Complaint   Patient presents with    Possible UTI     Started 2 days ago, patient complains of vaginal itching and discharge.          History of Present Illness       Patient presents for evaluation of a possible yeast infection. She states that it is itchy and she has white, clumpy vaginal discharge. She states she gets a lot of yeast infections and knows she needs the fluconazole. She also would test testing for some STDs today because she gets nervous and just wants to make sure.     Vaginal Discharge  The patient's primary symptoms include genital itching and vaginal discharge. The patient's pertinent negatives include no genital lesions, genital odor, genital rash, missed menses, pelvic pain or vaginal bleeding. This is a new problem. The current episode started yesterday. The problem occurs 2 to 4 times  per day. The problem has been unchanged. The patient is experiencing no pain. She is not pregnant. Pertinent negatives include no abdominal pain, back pain, chills, constipation, diarrhea, discolored urine, dysuria, fever, flank pain, frequency, hematuria or urgency. The vaginal discharge was white (Thick chunks). There has been no bleeding. Nothing aggravates the symptoms. She has tried nothing for the symptoms. She is sexually active. It is unknown whether or not her partner has an STD.       Review of Systems   Review of Systems   Constitutional:  Negative for activity change, appetite change, chills, diaphoresis, fatigue and fever.   Gastrointestinal:  Negative for abdominal pain, constipation and diarrhea.   Genitourinary:  Positive for vaginal discharge. Negative for decreased urine volume, difficulty urinating, dysuria, flank pain, frequency, hematuria, missed menses, pelvic pain, urgency, vaginal bleeding and vaginal pain.   Musculoskeletal:  Negative for back pain.   All other systems reviewed and are negative.        Current Medications       Current Outpatient Medications:     fluconazole (DIFLUCAN) 150 mg tablet, Take 1 tablet (150 mg total) by mouth once for 1 dose, Disp: 1 tablet, Rfl: 2    norethindrone-ethinyl estradiol-iron ([place] FE 1.5/30) 1.5-30 MG-MCG tablet, TAKE 1 TABLET BY MOUTH EVERY DAY, Disp: 84 tablet, Rfl: 1    sertraline (ZOLOFT) 50 mg tablet, Take 0.5 tablets (25 mg total) by mouth daily, Disp: , Rfl:     Current Allergies     Allergies as of 05/16/2024    (No Known Allergies)            The following portions of the patient's history were reviewed and updated as appropriate: allergies, current medications, past family history, past medical history, past social history, past surgical history and problem list.     Past Medical History:   Diagnosis Date    Anxiety     Candida infection of genital region 6/17/2020    Tonsillitis        Past Surgical History:   Procedure Laterality Date  "   NO PAST SURGERIES      WISDOM TOOTH EXTRACTION         Family History   Problem Relation Age of Onset    Anxiety disorder Mother     ADD / ADHD Mother     Alcohol abuse Father     ADD / ADHD Sister     Anxiety disorder Sister     Breast cancer Neg Hx     Colon cancer Neg Hx     Ovarian cancer Neg Hx     Uterine cancer Neg Hx     Cervical cancer Neg Hx          Medications have been verified.        Objective   /64   Pulse 104   Temp 97.9 °F (36.6 °C)   Resp 18   Ht 5' 3\" (1.6 m)   Wt 51.7 kg (114 lb)   SpO2 97%   BMI 20.19 kg/m²        Physical Exam     Physical Exam  Constitutional:       Appearance: Normal appearance.   Abdominal:      General: Abdomen is flat. There is no distension.      Palpations: Abdomen is soft. There is no mass.      Tenderness: There is no abdominal tenderness. There is no right CVA tenderness or left CVA tenderness.   Skin:     General: Skin is warm.      Capillary Refill: Capillary refill takes less than 2 seconds.   Neurological:      General: No focal deficit present.      Mental Status: She is alert.   Psychiatric:         Mood and Affect: Mood normal.         Behavior: Behavior normal.                   "

## 2024-05-16 NOTE — PATIENT INSTRUCTIONS
Urine dip results discussed with the patient today. We will send it out for testing for gonorrhea and chlamydia. We will contact the patient with any positive results and if treatment is needed.     Recommended fluconazole for vaginal yeast infection.       Follow up with PCP in 3-5 days.  Proceed to  ER if symptoms worsen.    If tests are performed, our office will contact you with results only if changes need to made to the care plan discussed with you at the visit. You can review your full results on [facility].

## 2024-05-17 LAB
BACTERIA UR CULT: ABNORMAL
C TRACH DNA SPEC QL NAA+PROBE: NEGATIVE
N GONORRHOEA DNA SPEC QL NAA+PROBE: NEGATIVE

## 2024-05-30 ENCOUNTER — OFFICE VISIT (OUTPATIENT)
Age: 19
End: 2024-05-30
Payer: COMMERCIAL

## 2024-05-30 VITALS
HEIGHT: 63 IN | OXYGEN SATURATION: 97 % | HEART RATE: 88 BPM | RESPIRATION RATE: 18 BRPM | TEMPERATURE: 98 F | SYSTOLIC BLOOD PRESSURE: 101 MMHG | DIASTOLIC BLOOD PRESSURE: 56 MMHG | WEIGHT: 117 LBS | BODY MASS INDEX: 20.73 KG/M2

## 2024-05-30 DIAGNOSIS — J02.0 STREP PHARYNGITIS: Primary | ICD-10-CM

## 2024-05-30 DIAGNOSIS — J02.9 SORE THROAT: ICD-10-CM

## 2024-05-30 LAB — S PYO AG THROAT QL: POSITIVE

## 2024-05-30 PROCEDURE — G0382 LEV 3 HOSP TYPE B ED VISIT: HCPCS | Performed by: PHYSICIAN ASSISTANT

## 2024-05-30 PROCEDURE — 87880 STREP A ASSAY W/OPTIC: CPT | Performed by: PHYSICIAN ASSISTANT

## 2024-05-30 RX ORDER — CEPHALEXIN 500 MG/1
500 CAPSULE ORAL EVERY 12 HOURS SCHEDULED
Qty: 20 CAPSULE | Refills: 0 | Status: SHIPPED | OUTPATIENT
Start: 2024-05-30 | End: 2024-06-09

## 2024-05-30 NOTE — PROGRESS NOTES
[facility]        NAME: Satya Bridges is a 19 y.o. female  : 2005    MRN: [id number]  DATE: May 30, 2024  TIME: 6:30 PM    Assessment and Plan   Strep pharyngitis [J02.0]  1. Strep pharyngitis  cephalexin (KEFLEX) 500 mg capsule      2. Sore throat  POCT rapid strepA            Patient Instructions     Patient Instructions   Discussed positive rapid strep test result with the patient. Recommended treatment with cephalexin for 10 days instead of amoxicillin at patient's request. Patient can continue with conservative symptomatic management with over the counter medications as needed.      Follow up with PCP in 3-5 days.  Proceed to  ER if symptoms worsen.    If tests are performed, our office will contact you with results only if changes need to made to the care plan discussed with you at the visit. You can review your full results on [facility].        Chief Complaint     Chief Complaint   Patient presents with    Sore Throat     Started a week ago, patient complains of pressure of back of neck, and sore throat.          History of Present Illness       Sore Throat   This is a new problem. The current episode started in the past 7 days. The problem has been waxing and waning. The pain is worse on the right side. There has been no fever. The pain is at a severity of 7/10. The pain is moderate. Associated symptoms include congestion, coughing, headaches, a hoarse voice, neck pain, swollen glands and trouble swallowing. Pertinent negatives include no abdominal pain, diarrhea, drooling, ear discharge, ear pain, plugged ear sensation, shortness of breath, stridor or vomiting.       Review of Systems   Review of Systems   HENT:  Positive for congestion, hoarse voice, sore throat and trouble swallowing. Negative for drooling, ear discharge and ear pain.    Respiratory:  Positive for cough. Negative for shortness of breath and stridor.    Gastrointestinal:  Negative for abdominal pain, diarrhea  "and vomiting.   Musculoskeletal:  Positive for neck pain.   Neurological:  Positive for headaches.   All other systems reviewed and are negative.        Current Medications       Current Outpatient Medications:     cephalexin (KEFLEX) 500 mg capsule, Take 1 capsule (500 mg total) by mouth every 12 (twelve) hours for 10 days, Disp: 20 capsule, Rfl: 0    norethindrone-ethinyl estradiol-iron (Tierra FE 1.5/30) 1.5-30 MG-MCG tablet, TAKE 1 TABLET BY MOUTH EVERY DAY, Disp: 84 tablet, Rfl: 1    sertraline (ZOLOFT) 50 mg tablet, Take 0.5 tablets (25 mg total) by mouth daily, Disp: , Rfl:     Current Allergies     Allergies as of 05/30/2024    (No Known Allergies)            The following portions of the patient's history were reviewed and updated as appropriate: allergies, current medications, past family history, past medical history, past social history, past surgical history and problem list.     Past Medical History:   Diagnosis Date    Anxiety     Candida infection of genital region 6/17/2020    Tonsillitis        Past Surgical History:   Procedure Laterality Date    NO PAST SURGERIES      WISDOM TOOTH EXTRACTION         Family History   Problem Relation Age of Onset    Anxiety disorder Mother     ADD / ADHD Mother     Alcohol abuse Father     ADD / ADHD Sister     Anxiety disorder Sister     Breast cancer Neg Hx     Colon cancer Neg Hx     Ovarian cancer Neg Hx     Uterine cancer Neg Hx     Cervical cancer Neg Hx          Medications have been verified.        Objective   /56   Pulse 88   Temp 98 °F (36.7 °C)   Resp 18   Ht 5' 3\" (1.6 m)   Wt 53.1 kg (117 lb)   SpO2 97%   BMI 20.73 kg/m²        Physical Exam     Physical Exam  Vitals and nursing note reviewed.   Constitutional:       Appearance: Normal appearance.   HENT:      Right Ear: Tympanic membrane, ear canal and external ear normal.      Left Ear: Tympanic membrane, ear canal and external ear normal.      Nose: Nose normal.      Mouth/Throat:     "  Mouth: Mucous membranes are moist.      Pharynx: Oropharyngeal exudate and posterior oropharyngeal erythema present.   Eyes:      Pupils: Pupils are equal, round, and reactive to light.   Cardiovascular:      Rate and Rhythm: Normal rate and regular rhythm.   Pulmonary:      Effort: Pulmonary effort is normal.      Breath sounds: Normal breath sounds.   Skin:     General: Skin is warm.      Capillary Refill: Capillary refill takes less than 2 seconds.   Neurological:      Mental Status: She is alert.   Psychiatric:         Mood and Affect: Mood normal.         Behavior: Behavior normal.

## 2024-05-30 NOTE — PATIENT INSTRUCTIONS
Discussed positive rapid strep test result with the patient. Recommended treatment with cephalexin for 10 days instead of amoxicillin at patient's request. Patient can continue with conservative symptomatic management with over the counter medications as needed.      Follow up with PCP in 3-5 days.  Proceed to  ER if symptoms worsen.    If tests are performed, our office will contact you with results only if changes need to made to the care plan discussed with you at the visit. You can review your full results on [facility].

## 2024-05-30 NOTE — LETTER
May 30, 2024     Patient: Satya Bridges   YOB: 2005   Date of Visit: 5/30/2024       To Whom it May Concern:    Satya Bridges was seen in my clinic on 5/30/2024. She may return to work on Friday May 31, 2024 .    If you have any questions or concerns, please don't hesitate to call.         Sincerely,          MELY NESS        CC: No Recipients

## 2024-06-13 ENCOUNTER — OFFICE VISIT (OUTPATIENT)
Dept: INTERNAL MEDICINE CLINIC | Facility: CLINIC | Age: 19
End: 2024-06-13
Payer: COMMERCIAL

## 2024-06-13 ENCOUNTER — TELEPHONE (OUTPATIENT)
Age: 19
End: 2024-06-13

## 2024-06-13 VITALS
OXYGEN SATURATION: 98 % | DIASTOLIC BLOOD PRESSURE: 60 MMHG | HEIGHT: 63 IN | HEART RATE: 91 BPM | TEMPERATURE: 96.8 F | WEIGHT: 116 LBS | BODY MASS INDEX: 20.55 KG/M2 | SYSTOLIC BLOOD PRESSURE: 102 MMHG

## 2024-06-13 DIAGNOSIS — F41.9 ANXIETY: ICD-10-CM

## 2024-06-13 PROCEDURE — 99213 OFFICE O/P EST LOW 20 MIN: CPT | Performed by: INTERNAL MEDICINE

## 2024-06-13 RX ORDER — SERTRALINE HYDROCHLORIDE 25 MG/1
12.5 TABLET, FILM COATED ORAL DAILY
Qty: 15 TABLET | Refills: 1 | Status: SHIPPED | OUTPATIENT
Start: 2024-06-13 | End: 2024-06-19 | Stop reason: SDUPTHER

## 2024-06-13 NOTE — PROGRESS NOTES
Ambulatory Visit  Name: Satya Bridges      : 2005      MRN: [id number]  Encounter Provider: Pepe Aquino MD  Encounter Date: 2024   Encounter department: [facility] INTERNAL MEDICINE [place]    Assessment & Plan     Patient would like to get off of Zoloft.  She says that she does not feel like herself on it.  Feels like she is on autopilot.  Zoloft 12.5 mg daily for a month, then 12.5 every other day.  For a month.  Then stop.      1. Anxiety  Assessment & Plan:  Start with 12.5 mg today x 1 months, then 12.5 every other day x 1 month, and then stop.  Orders:  -     sertraline (ZOLOFT) 25 mg tablet; Take 0.5 tablets (12.5 mg total) by mouth daily       History of Present Illness     Here with few issues to discuss.        Review of Systems   Constitutional:  Negative for chills and fever.   HENT:  Negative for ear pain and sore throat.    Eyes:  Negative for pain and visual disturbance.   Respiratory:  Negative for cough and shortness of breath.    Cardiovascular:  Negative for chest pain and palpitations.   Gastrointestinal:  Negative for abdominal pain and vomiting.   Genitourinary:  Negative for dysuria and hematuria.   Musculoskeletal:  Negative for arthralgias and back pain.   Skin:  Negative for color change and rash.   Neurological:  Negative for seizures and syncope.   All other systems reviewed and are negative.      Past Medical History   Past Medical History:   Diagnosis Date    Anxiety     Candida infection of genital region 2020    Tonsillitis      Past Surgical History:   Procedure Laterality Date    NO PAST SURGERIES      WISDOM TOOTH EXTRACTION       Family History   Problem Relation Age of Onset    Anxiety disorder Mother     ADD / ADHD Mother     Alcohol abuse Father     ADD / ADHD Sister     Anxiety disorder Sister     Breast cancer Neg Hx     Colon cancer Neg Hx     Ovarian cancer Neg Hx     Uterine cancer Neg Hx     Cervical cancer Neg Hx      Current Outpatient  "Medications on File Prior to Visit   Medication Sig Dispense Refill    norethindrone-ethinyl estradiol-iron (Tierra ELLIS 1.5/30) 1.5-30 MG-MCG tablet TAKE 1 TABLET BY MOUTH EVERY DAY 84 tablet 1    [DISCONTINUED] sertraline (ZOLOFT) 50 mg tablet Take 0.5 tablets (25 mg total) by mouth daily       No current facility-administered medications on file prior to visit.   No Known Allergies     Objective     /60 (BP Location: Left arm, Patient Position: Sitting, Cuff Size: Standard)   Pulse 91   Temp (!) 96.8 °F (36 °C) (Tympanic)   Ht 5' 3\" (1.6 m)   Wt 52.6 kg (116 lb)   SpO2 98%   BMI 20.55 kg/m²     Physical Exam  Vitals and nursing note reviewed.   Constitutional:       General: She is not in acute distress.     Appearance: She is well-developed.   HENT:      Head: Normocephalic and atraumatic.   Eyes:      Conjunctiva/sclera: Conjunctivae normal.   Cardiovascular:      Rate and Rhythm: Normal rate and regular rhythm.      Heart sounds: No murmur heard.  Pulmonary:      Effort: Pulmonary effort is normal. No respiratory distress.      Breath sounds: Normal breath sounds.   Abdominal:      Palpations: Abdomen is soft.      Tenderness: There is no abdominal tenderness.   Musculoskeletal:         General: No swelling.      Cervical back: Neck supple.   Skin:     General: Skin is warm and dry.      Capillary Refill: Capillary refill takes less than 2 seconds.   Neurological:      Mental Status: She is alert.   Psychiatric:         Mood and Affect: Mood normal.         Administrative Statements     I have spent a total time of 15 minutes on 06/13/24 In caring for this patient including Risks and benefits of tx options, Patient and family education, Impressions, Reviewing / ordering tests, medicine, procedures  , and Obtaining or reviewing history  .        "

## 2024-06-19 ENCOUNTER — PATIENT MESSAGE (OUTPATIENT)
Dept: INTERNAL MEDICINE CLINIC | Facility: CLINIC | Age: 19
End: 2024-06-19

## 2024-06-19 DIAGNOSIS — F41.9 ANXIETY: ICD-10-CM

## 2024-06-19 RX ORDER — SERTRALINE HYDROCHLORIDE 25 MG/1
12.5 TABLET, FILM COATED ORAL DAILY
Qty: 15 TABLET | Refills: 1 | Status: SHIPPED | OUTPATIENT
Start: 2024-06-19 | End: 2024-07-19

## 2024-06-27 ENCOUNTER — SEXUAL HEALTH (OUTPATIENT)
Dept: SURGERY | Facility: CLINIC | Age: 19
End: 2024-06-27

## 2024-06-27 DIAGNOSIS — Z11.3 SCREENING FOR STD (SEXUALLY TRANSMITTED DISEASE): Primary | ICD-10-CM

## 2024-06-27 NOTE — PROGRESS NOTES
Assessment/Plan:    No problem-specific Assessment & Plan notes found for this encounter.    Urine collected for GC/CT testing.  Blood collected for HIV/syphilis testing.  Recommend safer sex practices including 100% condom use.  Recommend regular STD testing.  Follow up 1 week for results.     Problem List Items Addressed This Visit    None  Visit Diagnoses       Screening for STD (sexually transmitted disease)    -  Primary              Subjective:      Patient ID: Satya Bridges is a 19 y.o. female.    Patient seen in STD clinic for STD screening.  Patient reports the male partner in the last 3 months with inconsistent condom use.  Reports having a yeast infection last week for which she is undergoing current treatment with Monistat.  Feels like symptoms have resolved.  Denies vaginal discharge, itching, burning, pain, rashes or sores, fever or chills.    The following portions of the patient's history were reviewed and updated as appropriate: allergies, current medications, past family history, past medical history, past social history, past surgical history, and problem list.    Review of Systems   Constitutional:  Negative for chills, diaphoresis, fatigue and fever.   Genitourinary:  Negative for decreased urine volume, difficulty urinating, dyspareunia, dysuria, frequency, genital sores, hematuria, pelvic pain, urgency, vaginal bleeding, vaginal discharge and vaginal pain.   Skin:  Negative for rash and wound.   Hematological:  Negative for adenopathy.       Objective:      There were no vitals taken for this visit.         Physical Exam  Nursing note reviewed.   Constitutional:       Appearance: Normal appearance. She is well-developed.   HENT:      Head: Normocephalic and atraumatic.   Genitourinary:     Exam position: Supine.      Labia:         Right: No rash, tenderness, lesion or injury.         Left: No rash, tenderness, lesion or injury.       Vagina: Normal. No signs of injury and foreign body. No  vaginal discharge, erythema, tenderness or bleeding.      Cervix: No discharge or friability.      Uterus: Not enlarged and not tender.    Neurological:      Mental Status: She is alert and oriented to person, place, and time.   Psychiatric:         Mood and Affect: Mood and affect normal.         Behavior: Behavior normal.         Thought Content: Thought content normal.         Judgment: Judgment normal.              CHIEF CONCERN(S)    Used Monistat  on Sunday - symptoms resolved basically. Seems to have chronic yeast in the past.   No LMP recorded.    LMP 6/11/24    Birth Control Method OCPs    Condom Used: No     Sexual Preference :  Male    Date of Last Sexual Exposure: 1 month ago    # of Partners: Last 30 days 1, Last 90 days 1, and Last Year 2    Sexual Practices: Oral, Vaginal, and Anal      Previously Sexually Transmitted Diseases  Type Date Source of Care Treatment Comment   CT 2 years ago PCP Oral pills                      Test Date Results   RPR 1/10/24 Non reactive   HIV 1/10/24 Non reactive   GC 5/16/24 negative   CT 5/16/24 negative         1. CURRENT RISK BEHAVIOR(S)    sex under the influence of drugs or alcohol     PREVIOUS SUCCESSES    Maintained a monogamous relationship with only one partner, Practiced abstinence, and Discussed condom use prior to having sex with partner(s)        3. SAFER GOAL BEHAVIOR(S)    Always use condoms to have sex, Practice abstinence, Practice monogamy, and Get tested if condom breaks/ leaks          4. PERSON ACTION PLAN:    > BARRIERS:    No condom use or discussions and Get involved in a monogamist relationship    > BENEFITS:    Obtain free condoms from [facility] to decrease STD exposure and Provides a healthier sexual relationship and can reduce STD's        > ACTION STEPS:      Use condoms at least 50% of the time and steadily increase over time until condom use is 100% of the time, Choose to abstain from sex, Discuss condom use prior to having sex with partner(s),  and Get tested is an exposure occurred such as a condom breaks/ leaked          4. REFERRALS:    HIV consent given

## 2024-07-01 ENCOUNTER — DOCUMENTATION (OUTPATIENT)
Dept: SURGERY | Facility: CLINIC | Age: 19
End: 2024-07-01

## 2024-07-01 NOTE — PROGRESS NOTES
Pt given results for HIV/STI testing via phone call since clinic will not be open on . Confirmed patient full name and .    Pt results were negative for HIV,CT, GC and RPR. Results reviewed and offered copies of results to patient if she would like. If Patient wants copies she will come to clinic to . Patient re-educated on safe sex practices.Patient states understanding.Pt encouraged to return with any new symptoms, new sexual partners and at least once yearly.Per STD clinic protocol client did not need to see provider, she was seen at previous visit and had no symptoms at this time

## 2024-07-02 NOTE — PROGRESS NOTES
Diagnoses and all orders for this visit:    Encounter for gynecological examination without abnormal finding  -     Liquid-based pap, screening    Encounter for surveillance of contraceptive pills  -     norethindrone-ethinyl estradiol-iron (Tierra ELLIS ) 1.5-30 MG-MCG tablet; Take 1 tablet by mouth daily      We have reviewed mechanism of action, benefits, risks, side effects of LARC's & IUD placement.  Advised to schedule for IUD insertion.  Patient has been advised to avoid intercourse for 2 weeks prior to insertion,  patient has been advised to take 600 mg of ibuprofen with food 1 hour prior to the appointment to help diminish cramping post placement.      Perineal hygiene reviewed   Weight bearing exercises minium of 150 mins/weekly advised.   Kegel exercises recommended.   SBE encouraged, ASCCP guidelines reviewed. Condoms encouraged with all sexual activity to prevent STI's.   Gardisil vaccines recommended up to age 45  Calcium/ Vit D dietary requirements discussed,   Advised to call with any issues,  all concerns & questions addressed.   See after visit summary for further information and recommendations to the above mentioned subjects which we may or may not have covered in detail during your visit  F/U Annually and PRN      Health Maintenance:    First PAP: collected today Not on file    Gardisil: Completed        Subjective    CC: Yearly Exam      Satya Bridges is a 19 y.o. female here for an annual exam.   GYN hx includes:  OCP use  Family hx of:  No GYN cancers  Medically stable, reports no changes in medical Hx, follows with PMD    Patient's last menstrual period was 2024 (exact date).  Her menstrual cycles are regular every 28-30 days. She denies issues with bleeding during her menses. Hx of BTB which is well controlled at this time   She denies breast concerns, abnormal vaginal discharge, vaginal itching, odor, irritation, bowel/bladder dysfunction, urinary symptoms, pelvic pain,  or dyspareunia today.   She is sexually active. Monogamous relationship.  Her current method of contraception includes OCPs. Denies any issues with her BCM.   She does not want STD testing today.  Denies intimate partner violence    In [facility] for classes towards business admin, done in 2 more classes   Works at [employer] currently, DM tech     Past Medical History:   Diagnosis Date    Anxiety     Candida infection of genital region 6/17/2020    Tonsillitis      Past Surgical History:   Procedure Laterality Date    NO PAST SURGERIES      WISDOM TOOTH EXTRACTION         Immunization History   Administered Date(s) Administered    COVID-19 PFIZER VACCINE 0.3 ML IM 11/16/2021, 12/07/2021    DTaP 2005, 2005, 07/19/2004, 02/14/2005, 08/15/2007    DTaP 5 2005, 2005, 07/19/2004, 02/14/2005, 08/15/2007    HPV9 04/17/2017, 10/01/2018    Hep A, adult 08/19/2008, 11/30/2009    Hep B / HiB 2005, 2005, 01/14/2004    Hep B, Adolescent or Pediatric 2003    Hep B, adult 2003    Hepatitis A 08/19/2008, 11/30/2009    INFLUENZA 2005, 01/14/2004, 10/27/2006, 11/06/2007, 02/13/2021, 11/04/2021, 10/05/2022, 09/14/2023    IPV 2005, 2005, 04/19/2004, 08/15/2007    Influenza, seasonal, injectable 2003, 01/14/2004, 11/06/2007    MMR 04/19/2004    MMRV 08/15/2007    Meningococcal MCV4, Unspecified 08/30/2019    Meningococcal MCV4P 08/30/2019    Meningococcal, Unknown Serogroups 07/11/2014    Pneumococcal Conjugate PCV 7 2003, 2003, 2003, 02/14/2005    Tdap 07/11/2014    Varicella 01/14/2004       Family History   Problem Relation Age of Onset    Anxiety disorder Mother     ADD / ADHD Mother     Alcohol abuse Father     ADD / ADHD Sister     Anxiety disorder Sister     Breast cancer Neg Hx     Colon cancer Neg Hx     Ovarian cancer Neg Hx     Uterine cancer Neg Hx     Cervical cancer Neg Hx      Social History     Tobacco Use    Smoking status: Every Day      "Types: E-Cigarettes     Passive exposure: Current    Smokeless tobacco: Never    Tobacco comments:     Vape/electronic cigarette only.   Vaping Use    Vaping status: Every Day    Substances: Nicotine, Flavoring   Substance Use Topics    Alcohol use: Yes     Comment: socially    Drug use: No       Current Outpatient Medications:     norethindrone-ethinyl estradiol-iron (Tierra RHONDA ) 1.5-30 MG-MCG tablet, Take 1 tablet by mouth daily, Disp: 90 tablet, Rfl: 4    sertraline (ZOLOFT) 25 mg tablet, Take 1 tablet (25 mg total) by mouth daily, Disp: 30 tablet, Rfl: 0  Patient Active Problem List    Diagnosis Date Noted    Breakthrough bleeding on birth control pills 2024    Surveillance of contraceptive pill 2019    Vitamin D deficiency 2018    Anxiety 2017       No Known Allergies    OB History    Para Term  AB Living   0 0 0 0 0 0   SAB IAB Ectopic Multiple Live Births   0 0 0 0 0   Obstetric Comments   Menarche 11 y.o       Vitals:    24 0851   BP: 110/68   BP Location: Left arm   Patient Position: Sitting   Cuff Size: Large   Weight: 54.4 kg (120 lb)   Height: 5' 3\" (1.6 m)     Body mass index is 21.26 kg/m².    Review of Systems     Constitutional: Negative for chills, fatigue, fever, headaches, visual disturbances, and unexpected weight change.   Respiratory: Negative for cough, & shortness of breath.  Cardiovascular: Negative for chest pain. .    Gastrointestinal: Negative for Abd pain, nausea & vomiting, constipation and diarrhea.   Genitourinary: Negative for difficulty urinating, dysuria, hematuria, dyspareunia, unusual vaginal bleeding or discharge  Skin: Negative skin changes    Physical Exam     Constitutional: Alert & Oriented x3, well-developed and well-nourished. No distress.   HENT: Atraumatic, Normocephalic, Conjunctivae clear  Neck: Normal range of motion. Neck supple. No thyromegaly, mass, nodules or tenderness  Pulmonary: Effort normal.   Abdominal: Soft. " No tenderness or masses  Musculoskeletal: Normal ROM  Skin: Warm & Dry  Psychological: Normal mood, thought content, behavior & judgement     Breasts:   Right: tissue soft without masses, tenderness, skin changes or nipple discharge. No areas of erythema or pain. No subclavicular, axillary, pectoral adenopathy  Left:  tissue soft without masses, tenderness, skin changes or nipple discharge. No areas of erythema or pain. No subclavicular, axillary, pectoral adenopathy    Pelvic exam was performed with patient supine, lithotomy position.      Labia: Negative rash, tenderness, lesion or injury on the right labia.              Negative rash, tenderness, lesion or injury on the left labia.   Urethral meatus:  Negative for  tenderness, inflammation or discharge.   Uterus: not deviated, enlarged, fixed or tender.   Cervix: No CMT, no discharge or friability.   Right adnexa: no mass, no tenderness and no fullness.  Left adnexa: no mass, no tenderness and no fullness.   Vagina: No erythema, tenderness, masses, or foreign body in the vagina. No signs of injury around the vagina. No unusual vaginal discharge   Perineum without lesions, signs of injury, erythema or swelling.  Inguinal Canal:        Right: No inguinal adenopathy or hernia present.        Left: No inguinal adenopathy or hernia present.

## 2024-07-03 NOTE — PATIENT INSTRUCTIONS
"Patient Education     Cervical cancer screening tests   The Basics   Written by the doctors and editors at [facility]   What is cervical cancer screening? -- Screening tests look for cancer cells in the cervix. The cervix is the bottom part of the uterus, where it meets the vagina (figure 1).  Screening tests also look for cells that could turn into cancer, called \"precancer.\" They can find cervical cancer and precancer in the early stages, when it can be treated or even cured.  What tests are used to screen for cervical cancer? -- There are a few different ways to screen:   Pap test - This is sometimes called a \"Pap smear.\" It involves taking cells from the surface of the cervix and sending them to a lab. Then, an expert will check the cells under a microscope.   HPV test - HPV stands for \"human papillomavirus.\" Some types of this virus can cause cervical cancer. An HPV test involves testing cells from the cervix for certain types of HPV.   Combination test - This means doing a Pap and HPV test at the same time.  What happens during a Pap or HPV test? -- For both types of tests, your doctor will take cells from the surface of your cervix. To do this, they will gently insert a device called a \"speculum\" into your vagina. The device helps to push apart the walls of your vagina so that the doctor can see the cervix. Then, they will use a small tool to lightly scrape cells from the surface of your cervix. The tool looks like a small spatula or brush. This might be a little uncomfortable, but usually does not hurt.  When should I start being screened for cervical cancer? -- Most experts recommend that you start having Pap tests when you turn 21. Some experts recommend HPV tests instead of Pap tests, starting at age 25. But this option might not be available in many places. Your doctor or nurse can talk to you about your options.  You should start getting Pap tests at the recommended age, whether or not you have ever been " "sexually active. Also, you do not need to start cervical cancer screening before age 21, even if you became sexually active at a younger age.  What should I do to prepare for a Pap or HPV test? -- You do not need to do anything special to prepare. People sometimes hear that they should not have sex or put anything in their vagina for 2 days before a Pap test, but this is not necessary. Pap tests work fine even if you have had sex recently.  Your doctor might recommend scheduling your test for when you do not expect to have your period. But don't worry if you do have your period on the day of the test. Screening can usually still be done even if you are bleeding. Your doctor can talk with you and let you know what to do.  How often should I be screened for cervical cancer? -- That depends on how old you are and the results of your past tests.   If you are age 21 to 29, you should have a Pap test every 3 years. Or, if your doctor recommends HPV testing instead, you should have a test every 5 years beginning at age 25.   If you are age 30 or older, you can have a Pap test every 3 years. The other options are having an HPV test every 5 years or a combination Pap and HPV test every 5 years.   If you are age 65 or older, you can stop having Pap tests if:   You had Pap tests done regularly until you turned 65.   You had 3 normal Pap tests in a row, or 2 normal combination Pap and HPV tests over the past 10 years (if the most recent test was within the past 5 years).   You do not have other medical conditions that could weaken your immune system - These include taking certain medicines or having HIV.  You might also get a Pap test for reasons other than cervical cancer screening. For example, if you have abnormal vaginal bleeding, your doctor might do a Pap test to try to figure out the cause.  Do I need to be screened for cervical cancer if I had a hysterectomy? -- If you have had surgery called a \"hysterectomy\" to remove " "your uterus, ask your doctor if you need to keep getting screened. After a hysterectomy, you probably do not need screening if:   Your cervix was removed along with your uterus, and   You did not have cervical cancer or precancer (sometimes called \"dysplasia\")  If you're not sure, your doctor can help you figure out if you need to continue screening.  Do I need to get screening tests if I had the HPV vaccine? -- Yes. Getting the HPV vaccine lowers your chances of getting an HPV infection that could lead to cervical cancer. But it does not completely protect you. You should still be screened for cancer or precancer.  What if I have an abnormal Pap test result? -- Abnormal Pap tests are common, and most people with an abnormal Pap test do not have cancer. If your Pap test has cells that look abnormal, your doctor or nurse can do more tests to figure out what is causing this. They will decide what to do based on your age, what your Pap test shows, and the results of any other tests you had.  Follow-up tests might include:   An HPV test - If you haven't already had an HPV test, your doctor might order one. They might be able to do this on the cells already taken during your Pap test.   Another Pap test in 12 months - Sometimes, if you wait a year and have another Pap test, you could find that the abnormal cells are back to normal. You might also have an HPV test at the same time.   A colposcopy - For this test, the doctor or nurse will use a speculum to look at your cervix, just like during a Pap test. But they will look more closely using a device that looks like a microscope. It allows the doctor or nurse to see the cervix in more detail. During this test, the doctor or nurse might also take tiny samples of tissue from the cervix. This is called a \"biopsy.\" The tissue samples are checked in a lab.  If you do have cervical cancer or precancer, there are effective treatments available. If your condition was found early, " "there is a good chance that you can be cured.  What if my HPV test comes back positive? -- Most people who have sex will be exposed to HPV at some point, and having HPV does not mean that you will definitely get cancer. For most people, HPV infection goes away on its own. But for some people, it does not. Long-lasting HPV infection increases your risk of cancer over time.  If your HPV test comes back positive, your doctor or nurse will talk with you about what to do. This will partly depend on whether your Pap test results were abnormal. If your HPV test is positive but your Pap test is normal, you might need to repeat the tests in 1 year so your doctor can see if anything has changed.  All topics are updated as new evidence becomes available and our peer review process is complete.  This topic retrieved from [employer] on: Feb 26, 2024.  Topic 95336 Version 21.0  Release: 32.2.4 - C32.56  © 2024 UpToDate, Inc. and/or its affiliates. All rights reserved.  figure 1: Female reproductive anatomy     The internal organs that make up the female reproductive system are located in the lower belly. These include the uterus, fallopian tubes, ovaries, cervix, and vagina.  The uterus has an inner lining, called the \"endometrium,\" and a thicker outer layer, called the \"myometrium.\"  Graphic 40546 Version 8.0  Consumer Information Use and Disclaimer   Disclaimer: This generalized information is a limited summary of diagnosis, treatment, and/or medication information. It is not meant to be comprehensive and should be used as a tool to help the user understand and/or assess potential diagnostic and treatment options. It does NOT include all information about conditions, treatments, medications, side effects, or risks that may apply to a specific patient. It is not intended to be medical advice or a substitute for the medical advice, diagnosis, or treatment of a health care provider based on the health care provider's examination and " assessment of a patient's specific and unique circumstances. Patients must speak with a health care provider for complete information about their health, medical questions, and treatment options, including any risks or benefits regarding use of medications. This information does not endorse any treatments or medications as safe, effective, or approved for treating a specific patient. UpToDate, Inc. and its affiliates disclaim any warranty or liability relating to this information or the use thereof.The use of this information is governed by the Terms of Use, available at https://www.[employer].com/en/know/clinical-effectiveness-terms. 2024© UpToDate, Inc. and its affiliates and/or licensors. All rights reserved.  Copyright   © 2024 UpToDate, Inc. and/or its affiliates. All rights reserved.  Patient Education     Lowering Your Risk of Breast Cancer   About this topic   Breast cancer is a serious illness. Breast cancer is when abnormal cells grow and divide more quickly in your breast. These cells form a growth or tumor. The abnormal cells may enter nearby tissue and spread to other parts of the body. It is the type of cancer most often seen in women. Men can have breast cancer, but it is a rare condition.  General   Some things in your life may increase your risk of breast cancer. You may not be able to change some of these. Others you can control.  You are more likely to get breast cancer if you:  Have a mother, sister, or daughter who has had breast cancer  Have used hormones for menopause for more than 5 years  Have had radiation therapy to the breast or chest in the past  Are overweight or do not exercise  Had your first menstrual period before you were 11 years old  Went through menopause after age 53  Have never been pregnant or had your first child after age 35  Have had breast cancer before  Drink alcohol in any form  Have dense breasts  Are older in age  There is no certain way to prevent breast cancer.  There are things you can do to lower your chances of having breast cancer.  Keep a healthy weight. Lose weight if you are overweight. Being overweight raises your chances of having breast cancer.  Eat a healthy diet to maintain a healthy weight, such as more fruits, vegetables, and lean cuts of meat. Decrease the amount of saturated fat in your diet.  Exercise. Being active helps you keep a healthy weight.  Limit your alcohol intake or do not drink alcohol. The more alcohol you drink, the higher your risk.  Do not smoke cigarettes. Smoking can increase your risk of many types of cancer.  Breastfeed your baby. This may help protect you. The longer you breastfeed, the more protection you have.  Talk with your doctor about:  Limiting or stopping hormone therapy.  Taking certain drugs to prevent breast cancer. For women at high risk of having breast cancer, there are a few drugs that may lower your risk.  Surgery to prevent you from having breast cancer if you are very high risk.  When do I need to call the doctor?   Changes in your breasts  A lump or area in your breast that feels different  Discharge from your nipple  Skin on your breast is dimpled or indented  You have questions or concerns about your breasts  Helpful tips   Talk to your doctor about the best kind of breast cancer screening for you.  If you want to do self breast exams, have your doctor show you the right way to do them.  Tell your doctor of any abnormal finding.  Last Reviewed Date   2021-10-04  Consumer Information Use and Disclaimer   This generalized information is a limited summary of diagnosis, treatment, and/or medication information. It is not meant to be comprehensive and should be used as a tool to help the user understand and/or assess potential diagnostic and treatment options. It does NOT include all information about conditions, treatments, medications, side effects, or risks that may apply to a specific patient. It is not intended to  be medical advice or a substitute for the medical advice, diagnosis, or treatment of a health care provider based on the health care provider's examination and assessment of a patient’s specific and unique circumstances. Patients must speak with a health care provider for complete information about their health, medical questions, and treatment options, including any risks or benefits regarding use of medications. This information does not endorse any treatments or medications as safe, effective, or approved for treating a specific patient. UpToDate, Inc. and its affiliates disclaim any warranty or liability relating to this information or the use thereof. The use of this information is governed by the Terms of Use, available at https://www.[employer].centrose/en/know/clinical-effectiveness-terms   Copyright   Copyright © 2024 UpToDate, Inc. and its affiliates and/or licensors. All rights reserved.       Perineal Hygiene      Your vaginal naturally takes care of its self, it is a self washing system, the less you mess the healthier it will be     No soaps or feminine wash to the vulva, these products can cause dermitis, bacterial infections and other vulvar problems.   Use only water to cleanse, or water with Dove or Dove Sensitive Skin Bar soap if necessary.    No scented lotions or products are advised in or near your vulva.    Use only coconut oil for moisture if needed.  No douching this may cause imbalance in your vaginal PH and further issues.    If you wear panty liners, you may apply a thin coating of Vaseline, A&D ointment or coconut oil to the vulvar tissues as a skin barrier     Cotton underware, loose fitting clothing  Only perfume-free, dye-free laundry detergent, use a second rinse cycle   Avoid fabric softeners/dryer sheets.       Your partner should avoid the same products as well.       Over the counter probiotic to restore vaginal cheli may be helpful as well, take daily.       You may also look into  Boric Acid vaginal suppositories to restore vaginal PH balance for up to 2 weeks as directed on the box. You may not use these if you are pregnant      For vaginal dryness:      You may use:     Coconut oil (organic, pure, unscented) as needed for moisture or lubrication. ( Do not use if allergic)       Replens moisture restore external comfort gel daily ( use as directed on the box)        Replens long lasting vaginal moisturizer  ( use as directed on the box)         For Vaginal Lubrication:          You may use:     Coconut oil (organic, pure, unscented) as a lubricant or another scent-free lubricant (Astroglide, Uberlube) if needed.  Do not use coconut oil or silicone if using a condom as this may break down the integrity of the condom and cause an unplanned pregnancy              Do not use coconut oil if allergic               Replens silky smooth lubricant, premium silicone based lubricant for intercourse. ( use as directed, a small amount will provide an enhanced natural feeling)     Any premium over the counter vaginal lubricant water or silicone based. Silicone based will have more staying power.     Birth Control Pills     Birth control pills  are also called oral contraceptives, or the pill. It is medicine that helps prevent pregnancy by stopping ovulation. Ovulation is when the ovaries make and release an egg cell each month. If this egg gets fertilized by sperm, pregnancy occurs. You will need to take the pill at the same time every day. Your healthcare provider will tell you when to start taking the pill. You will also be told what to do if you miss a dose. Instructions will depend on the kind of birth control pills you are taking.   Different kinds of birth control pills:  Some kinds are taken for 21 days in a row, followed by 7 days of placebo (no hormones) pills. Other kinds are taken for 24 days followed by 4 days of placebos. Each kind has a certain amount of female hormones. Your provider will  decide on the kind that is best for you based on your age and other health conditions.  Call your local emergency number (911 in the [place]) for any of the following:   You have any of the following signs of a stroke:      Numbness or drooping on one side of your face     Weakness in an arm or leg    Confusion or difficulty speaking    Dizziness, a severe headache, or vision loss    You feel lightheaded, short of breath, and have chest pain.    You cough up blood.    Seek care immediately if:   Your arm or leg feels warm, tender, and painful. It may look swollen and red.    You have severe pain, numbness, or swelling in your arms or legs.    Contact your healthcare provider if:   You have forgotten to take a birth control pill.    You have mood changes, such as depression, since starting birth control pills.    You have nausea or are vomiting.    You have severe abdominal pain.    You missed a period and have questions or concerns about being pregnant.    You still have bleeding 4 months after taking birth control pills correctly.    You have questions or concerns about your condition or care.    What may be done before you can start taking birth control pills:  You need to see your healthcare provider to get a prescription. Any of the following may be done before your healthcare provider gives you a prescription:  Your healthcare provider will ask about diseases and illnesses you have had in the past. Your provider will check your risk for blood clots, heart conditions, or stroke. Tell your provider if you had gastric bypass surgery. This surgery can affect the way your body absorbs medicines such as birth control pills.    Your provider will also check your blood pressure, and may do a breast and pelvic exam. A Pap smear may also be done during the pelvic exam. This is a test to make sure you do not have abnormal changes on your cervix. You may need other tests, such as a urine test to make sure you are not  pregnant.    Your provider will ask if you take any medicines and if you smoke. Smoking increases your risk for stroke, heart attack, or a blood clot in your lungs. If you smoke, you should not take certain kinds of birth control pills.    Advantages of birth control pills:  When birth control pills are used correctly, the chances of getting pregnant are very low. Birth control pills may help decrease bleeding and pain during your monthly period. They may also help prevent cancer of the uterus and ovaries.  Disadvantages of birth control pills:  You may have sudden changes in your mood or feelings while you take birth control pills. You may have nausea and a decreased sex drive. You may have an increased appetite and rapid weight gain. You may also have bleeding in between periods, less frequent periods, vaginal dryness, and breast pain. Birth control pills will not protect you from sexually transmitted infections. Rarely, some birth control pills can increase your risk for a blood clot. This may become life-threatening.  If you decide you want to get pregnant:  If you are planning to have a baby, ask your healthcare provider when you may stop taking your birth control pills. It may take some time for you to start ovulating again. Ask your healthcare provider for more information about pregnancy after birth control pills.  When to start taking birth control pills after you have a baby:  If you are not breastfeeding, you may start taking birth control pills 3 weeks after you give birth. You may be able to take certain types of birth control pills if you are breastfeeding. These pills can be started from 6 weeks to 6 months after you give birth. Ask your healthcare provider for more information about when to start taking birth control pills after you give birth.  What you need to know about birth control pills and menopause:   Talk with your healthcare provider if you want to take birth control pills around menopause.      Around age 45, you will enter into perimenopause. This means your hormone levels are dropping and you are ovulating less often. You can still become pregnant during this time. The risk for problems, such as miscarriage, are higher if you become pregnant after age 45. Birth control pills will prevent pregnancy, and may also help prevent or relieve some signs and symptoms of menopause. Examples are hot flashes and mood swings.     Your provider will do tests when you are around age 50. The tests may show that you are in menopause. If the tests do not show menopause for sure, you may be able to continue taking the pill up to age 55. The decision will depend on your health and if you have any medical conditions, such as a blood clot.    Do not smoke:  Nicotine and other chemicals in cigarettes and cigars increase your risk for a blood clot while you use birth control pills. The risk is higher if you are also 35 or older. Ask your healthcare provider for information if you currently smoke and need help to quit. E-cigarettes or smokeless tobacco still contain nicotine. Talk to your healthcare provider before you use these products.  Follow up with your healthcare provider as directed:  Write down your questions so you remember to ask them during your visits.  © Copyright "[employer] ([employer] 2020 Information is for End User's use only and may not be sold, redistributed or otherwise used for commercial purposes. All illustrations and images included in CareNotes® are the copyrighted property of [employer] or [employer]  The above information is an  only. It is not intended as medical advice for individual conditions or treatments. Talk to your doctor, nurse or pharmacist before following any medical regimen to see if it is safe and effective for you.     Patient Education     Intrauterine devices (IUDs)   The Basics   Written by the doctors and editors at [facility]   What is an intrauterine device?  "-- An intrauterine device (\"IUD\") is a type of birth control. It is a small, T-shaped device. A doctor or nurse puts an IUD in your uterus by going through your vagina and cervix (figure 1).  IUDs are made of flexible plastic and have 2 thin plastic strings that hang out of the cervix. They are very small, a little more than 1 inch (2.5 cm) in width and length.  An IUD is one of the safest, most effective methods for preventing pregnancy. It is a good choice for people, including teens, who do not want to get pregnant for at least 1 year. An IUD can also be used to prevent pregnancy if it is put in within 5 days after you have unprotected sex. This is known as \"emergency contraception.\"  You can also use IUDs for reasons other than birth control. For example, 1 type of IUD can be used to treat heavy, painful periods.  What are the different types of IUDs? -- There are 2 categories of IUDs available in the [place]. One type contains copper. The other type contains a hormone called \"levonorgestrel\" (figure 2):   Copper IUD - There is only 1 copper-containing IUD (brand name: Paragard). It can stay in your uterus for 10 years, or longer for some people, to prevent pregnancy. Some people who use it get heavier or longer periods than they had before getting the IUD.   Hormonal IUDs - There are 4 hormone-containing IUDs (brand names: Mirena, Liletta, Kyleena, Courtney) (figure 2). Depending on which of these you have, it can stay in your uterus for up to 8, 5, or 3 years. Many people who use hormonal IUDs have lighter, less painful periods than they had before getting the IUD. Some people stop getting a period at all, but this is not harmful. After having the IUD removed, periods usually return to normal within a month or 2.  Other types of IUDs are also available outside of the [place].  What are the benefits of using an IUD? -- The benefits of using an IUD include:   IUDs are very effective. Fewer than 1 in 100 people who use an IUD " "get pregnant during the first year of use.   You do not have to remember to do anything or take any birth control medicines on a regular basis.   IUDs have few side effects.   IUDs do not contain estrogen, a hormone that some people can't or don't want to take.   If you decide you want to get pregnant, you can have the IUD taken out.   If you use an IUD for several years, it costs less overall than many other types of birth control. That's because there are no costs after you have it inserted.   There is evidence that using an IUD lowers your risk of getting cervical cancer.  What are the downsides of using an IUD? -- The downsides of using an IUD include:   Unlike condoms, an IUD does not protect you against infections that you can catch during sex. These are called \"sexually transmitted infections\" (\"STIs\") or \"sexually transmitted diseases.\" But you and your partner(s) can use condoms to prevent spreading infections.   There is a small chance that the IUD will come out during your period. If this happens, you will need to get a new IUD. If you see your IUD in your underwear, on your pad, or in the toilet, call your doctor or nurse.   The initial cost is higher than the cost of other methods. But, there are no more costs after it is inserted.   Only a doctor or nurse can insert or remove an IUD.  You should not get an IUD if you recently had an infection that spread to your uterus and other nearby organs. This is called a \"pelvic infection.\" STIs such as chlamydia and gonorrhea can cause pelvic infections.  Which type of IUD is best for me? -- Your nurse or doctor can talk to you about the options and help you choose the right IUD for you.  A copper IUD might be a good choice if you:   Want or need to avoid hormones   Want to avoid big changes in your period, such as not having any periods or bleeding or spotting between periods   Want birth control for up to 10 years, or possibly longer  A hormonal IUD might be a " "good choice if you:   Have heavy, painful periods. These IUDs can make your periods lighter and less painful.   Have pelvic pain from a condition called \"endometriosis.\" These IUDs might help reduce the pain.   Want birth control for up to 8 years, depending on which device you choose  Does it hurt to have an IUD put in? -- You will likely feel some discomfort and slight cramping after the nurse or doctor puts the IUD in your uterus. People who have never given birth might feel more discomfort than people who have. The cramps generally go away within a day or 2. Over-the-counter pain medicines like ibuprofen (sample brand names: Advil, Motrin) or naproxen (sample brand name: Aleve) can help cramps go away faster.  After the IUD is in place, you should not be able to feel it.  Should I see a doctor or nurse? -- If you have an IUD, see your doctor or nurse right away if:   You have bad pain in your lower belly.   Your period is late or very different from normal.   You cannot feel the string of the IUD or if the string seems shorter than usual.   You think your IUD might have moved or fallen out.   You had sex with someone who has or might have an STI, or you think you have an STI.   You have an unexplained fever.  All topics are updated as new evidence becomes available and our peer review process is complete.  This topic retrieved from [employer] on: Feb 26, 2024.  Topic 17646 Version 21.0  Release: 32.2.4 - C32.56  © 2024 UpToDate, Inc. and/or its affiliates. All rights reserved.  figure 1: Female reproductive anatomy     The internal organs that make up the female reproductive system are located in the lower belly. These include the uterus, fallopian tubes, ovaries, cervix, and vagina.  The uterus has an inner lining, called the \"endometrium,\" and a thicker outer layer, called the \"myometrium.\"  Graphic 68506 Version 8.0  figure 2: IUDs     This picture shows 2 types of IUDs. There are different IUDs available. They " are placed inside the uterus to help prevent pregnancy. Some hormonal IUDs can help reduce menstrual bleeding. The copper IUD can actually make menstrual bleeding heavier.  Graphic 20106 Version 15.0  Consumer Information Use and Disclaimer   Disclaimer: This generalized information is a limited summary of diagnosis, treatment, and/or medication information. It is not meant to be comprehensive and should be used as a tool to help the user understand and/or assess potential diagnostic and treatment options. It does NOT include all information about conditions, treatments, medications, side effects, or risks that may apply to a specific patient. It is not intended to be medical advice or a substitute for the medical advice, diagnosis, or treatment of a health care provider based on the health care provider's examination and assessment of a patient's specific and unique circumstances. Patients must speak with a health care provider for complete information about their health, medical questions, and treatment options, including any risks or benefits regarding use of medications. This information does not endorse any treatments or medications as safe, effective, or approved for treating a specific patient. UpToDate, Inc. and its affiliates disclaim any warranty or liability relating to this information or the use thereof.The use of this information is governed by the Terms of Use, available at https://www.woltersOptima Neuroscienceuwer.com/en/know/clinical-effectiveness-terms. 2024© UpToDate, Inc. and its affiliates and/or licensors. All rights reserved.  Copyright   © 2024 UpToDate, Inc. and/or its affiliates. All rights reserved.

## 2024-07-05 ENCOUNTER — ANNUAL EXAM (OUTPATIENT)
Dept: OBGYN CLINIC | Facility: CLINIC | Age: 19
End: 2024-07-05
Payer: COMMERCIAL

## 2024-07-05 VITALS
BODY MASS INDEX: 21.26 KG/M2 | HEIGHT: 63 IN | DIASTOLIC BLOOD PRESSURE: 68 MMHG | SYSTOLIC BLOOD PRESSURE: 110 MMHG | WEIGHT: 120 LBS

## 2024-07-05 DIAGNOSIS — Z01.419 ENCOUNTER FOR GYNECOLOGICAL EXAMINATION WITHOUT ABNORMAL FINDING: Primary | ICD-10-CM

## 2024-07-05 DIAGNOSIS — Z30.41 ENCOUNTER FOR SURVEILLANCE OF CONTRACEPTIVE PILLS: ICD-10-CM

## 2024-07-05 DIAGNOSIS — F41.9 ANXIETY: ICD-10-CM

## 2024-07-05 PROCEDURE — 99395 PREV VISIT EST AGE 18-39: CPT | Performed by: OBSTETRICS & GYNECOLOGY

## 2024-07-05 PROCEDURE — G0145 SCR C/V CYTO,THINLAYER,RESCR: HCPCS | Performed by: OBSTETRICS & GYNECOLOGY

## 2024-07-05 RX ORDER — NORETHINDRONE ACETATE AND ETHINYL ESTRADIOL 1.5-30(21)
1 KIT ORAL DAILY
Qty: 90 TABLET | Refills: 4 | Status: SHIPPED | OUTPATIENT
Start: 2024-07-05

## 2024-07-05 RX ORDER — SERTRALINE HYDROCHLORIDE 25 MG/1
25 TABLET, FILM COATED ORAL DAILY
Qty: 30 TABLET | Refills: 0 | Status: SHIPPED | OUTPATIENT
Start: 2024-07-05 | End: 2024-08-04

## 2024-07-12 LAB
LAB AP GYN PRIMARY INTERPRETATION: NORMAL
Lab: NORMAL

## 2024-07-25 ENCOUNTER — OFFICE VISIT (OUTPATIENT)
Age: 19
End: 2024-07-25
Payer: COMMERCIAL

## 2024-07-25 VITALS
HEIGHT: 63 IN | DIASTOLIC BLOOD PRESSURE: 68 MMHG | TEMPERATURE: 97.7 F | WEIGHT: 115.8 LBS | HEART RATE: 92 BPM | SYSTOLIC BLOOD PRESSURE: 92 MMHG | BODY MASS INDEX: 20.52 KG/M2

## 2024-07-25 DIAGNOSIS — N76.0 ACUTE VAGINITIS: Primary | ICD-10-CM

## 2024-07-25 PROCEDURE — 87480 CANDIDA DNA DIR PROBE: CPT | Performed by: OBSTETRICS & GYNECOLOGY

## 2024-07-25 PROCEDURE — 99214 OFFICE O/P EST MOD 30 MIN: CPT | Performed by: OBSTETRICS & GYNECOLOGY

## 2024-07-25 PROCEDURE — 87660 TRICHOMONAS VAGIN DIR PROBE: CPT | Performed by: OBSTETRICS & GYNECOLOGY

## 2024-07-25 PROCEDURE — 87510 GARDNER VAG DNA DIR PROBE: CPT | Performed by: OBSTETRICS & GYNECOLOGY

## 2024-07-25 RX ORDER — NYSTATIN AND TRIAMCINOLONE ACETONIDE 100000; 1 [USP'U]/G; MG/G
OINTMENT TOPICAL 2 TIMES DAILY
Qty: 15 G | Refills: 1 | Status: SHIPPED | OUTPATIENT
Start: 2024-07-25

## 2024-07-25 NOTE — PROGRESS NOTES
Ambulatory Visit  Name: Satya Bridges      : 2005      MRN: [id number]  Encounter Provider: Jaycee Mendez DO  Encounter Date: 2024   Encounter department: [facility] [place]    Assessment & Plan   1. Acute vaginitis  Assessment & Plan:  Pt reports recurrent yeast infections now not improved with diflucan  Has been seen at urgent care- diagnosed with exam only, no microscopy or culture per pt report  Neg STI testing, no new partners  Pt with good vulvar hygiene practices- reviewed today at length  Affirm collected today confirm actual yeast infection and not BV or co-infection- pt agreeable  External irritation noted- will RX mycolog and f/u affirm results  Discussed if recurrent yeast will need prolonged treatment/suppression course  Orders:  -     nystatin-triamcinolone (MYCOLOG-II) ointment; Apply topically 2 (two) times a day      History of Present Illness       Satya Bridges is a 19 y.o. female who presents today for vaginitis.  She thinks she has a yeast infection- has been getting several over the last few months.  [facility] bc was unable to get appt with us    Has been getting recurrent yeast- she knows what it feels like to have. Usually improving with diflucan now has to use monistat as diflucan has not been working anymore for her.   She notes discharge, itching and now burning with urination from the irritation.    She is SA, no new partner and not concerned for STI. Has been tested multiple times.   Denies any fever/chills, pelvic pain    She does not use any feminine hygiene products and denies any new soaps, lotions, washes or detergents.       Review of Systems  Negative unless otherwise noted in HPI.    Past Medical History   Past Medical History:   Diagnosis Date    Anxiety     Candida infection of genital region 2020    Tonsillitis      Past Surgical History:   Procedure Laterality Date     "NO PAST SURGERIES      WISDOM TOOTH EXTRACTION       Family History   Problem Relation Age of Onset    Anxiety disorder Mother     ADD / ADHD Mother     Alcohol abuse Father     ADD / ADHD Sister     Anxiety disorder Sister     Breast cancer Neg Hx     Colon cancer Neg Hx     Ovarian cancer Neg Hx     Uterine cancer Neg Hx     Cervical cancer Neg Hx      Current Outpatient Medications on File Prior to Visit   Medication Sig Dispense Refill    norethindrone-ethinyl estradiol-iron (Tierra FE 1.5/30) 1.5-30 MG-MCG tablet Take 1 tablet by mouth daily 90 tablet 4    sertraline (ZOLOFT) 25 mg tablet Take 1 tablet (25 mg total) by mouth daily 30 tablet 0     No current facility-administered medications on file prior to visit.   No Known Allergies   Objective     BP 92/68 (BP Location: Left arm, Patient Position: Sitting, Cuff Size: Adult)   Pulse 92   Temp 97.7 °F (36.5 °C) (Temporal)   Ht 5' 3\" (1.6 m)   Wt 52.5 kg (115 lb 12.8 oz)   LMP 07/08/2024 (Exact Date)   BMI 20.51 kg/m²     Physical Exam  Constitutional:       General: She is not in acute distress.  HENT:      Head: Normocephalic and atraumatic.      Mouth/Throat:      Mouth: Mucous membranes are moist.   Cardiovascular:      Rate and Rhythm: Normal rate.   Pulmonary:      Effort: Pulmonary effort is normal. No respiratory distress.   Abdominal:      General: There is no distension.      Palpations: Abdomen is soft.      Tenderness: There is no abdominal tenderness.   Genitourinary:     Labia:         Right: No tenderness or lesion.         Left: No tenderness or lesion.       Vagina: Vaginal discharge (thin white discharge) present. No tenderness or lesions.      Cervix: No cervical motion tenderness or friability.      Uterus: Not enlarged and not tender.       Adnexa:         Right: No tenderness.          Left: No tenderness.        Comments: External genitalia erythematous and irritated  Neurological:      Mental Status: She is alert.             "

## 2024-07-25 NOTE — ASSESSMENT & PLAN NOTE
Pt reports recurrent yeast infections now not improved with diflucan  Has been seen at urgent care- diagnosed with exam only, no microscopy or culture per pt report  Neg STI testing, no new partners  Pt with good vulvar hygiene practices- reviewed today at length  Affirm collected today confirm actual yeast infection and not BV or co-infection- pt agreeable  External irritation noted- will RX mycolog and f/u affirm results  Discussed if recurrent yeast will need prolonged treatment/suppression course

## 2024-07-26 DIAGNOSIS — B37.9 YEAST INFECTION: Primary | ICD-10-CM

## 2024-07-26 LAB
CANDIDA RRNA VAG QL PROBE: DETECTED
G VAGINALIS RRNA GENITAL QL PROBE: NOT DETECTED
T VAGINALIS RRNA GENITAL QL PROBE: NOT DETECTED

## 2024-07-30 DIAGNOSIS — F41.9 ANXIETY: ICD-10-CM

## 2024-07-30 RX ORDER — SERTRALINE HYDROCHLORIDE 25 MG/1
25 TABLET, FILM COATED ORAL DAILY
Qty: 100 TABLET | Refills: 1 | Status: SHIPPED | OUTPATIENT
Start: 2024-07-30 | End: 2025-02-15

## 2024-09-17 DIAGNOSIS — F41.9 ANXIETY: ICD-10-CM

## 2024-09-18 RX ORDER — SERTRALINE HYDROCHLORIDE 25 MG/1
25 TABLET, FILM COATED ORAL DAILY
Qty: 100 TABLET | Refills: 1 | Status: SHIPPED | OUTPATIENT
Start: 2024-09-18 | End: 2025-04-06

## 2024-12-22 DIAGNOSIS — Z30.41 ENCOUNTER FOR SURVEILLANCE OF CONTRACEPTIVE PILLS: ICD-10-CM

## 2024-12-23 RX ORDER — NORETHINDRONE ACETATE AND ETHINYL ESTRADIOL 1.5-30(21)
1 KIT ORAL DAILY
Qty: 90 TABLET | Refills: 1 | Status: SHIPPED | OUTPATIENT
Start: 2024-12-23

## 2025-03-11 DIAGNOSIS — Z30.41 ENCOUNTER FOR SURVEILLANCE OF CONTRACEPTIVE PILLS: ICD-10-CM

## 2025-03-11 RX ORDER — NORETHINDRONE ACETATE AND ETHINYL ESTRADIOL 1.5-30(21)
1 KIT ORAL DAILY
Qty: 90 TABLET | Refills: 0 | OUTPATIENT
Start: 2025-03-11

## 2025-03-28 ENCOUNTER — OFFICE VISIT (OUTPATIENT)
Dept: INTERNAL MEDICINE CLINIC | Facility: CLINIC | Age: 20
End: 2025-03-28
Payer: COMMERCIAL

## 2025-03-28 VITALS
WEIGHT: 117.8 LBS | DIASTOLIC BLOOD PRESSURE: 60 MMHG | HEIGHT: 63 IN | SYSTOLIC BLOOD PRESSURE: 104 MMHG | OXYGEN SATURATION: 99 % | BODY MASS INDEX: 20.87 KG/M2 | HEART RATE: 90 BPM

## 2025-03-28 DIAGNOSIS — F41.9 ANXIETY: ICD-10-CM

## 2025-03-28 DIAGNOSIS — Z00.00 ANNUAL PHYSICAL EXAM: Primary | ICD-10-CM

## 2025-03-28 DIAGNOSIS — Z20.2 POTENTIAL EXPOSURE TO STD: ICD-10-CM

## 2025-03-28 DIAGNOSIS — Z13.1 SCREENING FOR DIABETES MELLITUS: ICD-10-CM

## 2025-03-28 DIAGNOSIS — Z13.6 SCREENING FOR HEART DISEASE: ICD-10-CM

## 2025-03-28 PROCEDURE — 99395 PREV VISIT EST AGE 18-39: CPT

## 2025-03-28 PROCEDURE — 99213 OFFICE O/P EST LOW 20 MIN: CPT

## 2025-03-28 RX ORDER — FLUOXETINE 10 MG/1
10 TABLET, FILM COATED ORAL DAILY
Qty: 90 TABLET | Refills: 0 | Status: SHIPPED | OUTPATIENT
Start: 2025-03-28

## 2025-03-28 NOTE — PATIENT INSTRUCTIONS
"Patient Education     Routine physical for adults   The Basics   Written by the doctors and editors at [facility]   What is a physical? -- A physical is a routine visit, or \"check-up,\" with your doctor. You might also hear it called a \"wellness visit\" or \"preventive visit.\"  During each visit, the doctor will:   Ask about your physical and mental health   Ask about your habits, behaviors, and lifestyle   Do an exam   Give you vaccines if needed   Talk to you about any medicines you take   Give advice about your health   Answer your questions  Getting regular check-ups is an important part of taking care of your health. It can help your doctor find and treat any problems you have. But it's also important for preventing health problems.  A routine physical is different from a \"sick visit.\" A sick visit is when you see a doctor because of a health concern or problem. Since physicals are scheduled ahead of time, you can think about what you want to ask the doctor.  How often should I get a physical? -- It depends on your age and health. In general, for people age 21 years and older:   If you are younger than 50 years, you might be able to get a physical every 3 years.   If you are 50 years or older, your doctor might recommend a physical every year.  If you have an ongoing health condition, like diabetes or high blood pressure, your doctor will probably want to see you more often.  What happens during a physical? -- In general, each visit will include:   Physical exam - The doctor or nurse will check your height, weight, heart rate, and blood pressure. They will also look at your eyes and ears. They will ask about how you are feeling and whether you have any symptoms that bother you.   Medicines - It's a good idea to bring a list of all the medicines you take to each doctor visit. Your doctor will talk to you about your medicines and answer any questions. Tell them if you are having any side effects that bother you. You " "should also tell them if you are having trouble paying for any of your medicines.   Habits and behaviors - This includes:   Your diet   Your exercise habits   Whether you smoke, drink alcohol, or use drugs   Whether you are sexually active   Whether you feel safe at home  Your doctor will talk to you about things you can do to improve your health and lower your risk of health problems. They will also offer help and support. For example, if you want to quit smoking, they can give you advice and might prescribe medicines. If you want to improve your diet or get more physical activity, they can help you with this, too.   Lab tests, if needed - The tests you get will depend on your age and situation. For example, your doctor might want to check your:   Cholesterol   Blood sugar   Iron level   Vaccines - The recommended vaccines will depend on your age, health, and what vaccines you already had. Vaccines are very important because they can prevent certain serious or deadly infections.   Discussion of screening - \"Screening\" means checking for diseases or other health problems before they cause symptoms. Your doctor can recommend screening based on your age, risk, and preferences. This might include tests to check for:   Cancer, such as breast, prostate, cervical, ovarian, colorectal, prostate, lung, or skin cancer   Sexually transmitted infections, such as chlamydia and gonorrhea   Mental health conditions like depression and anxiety  Your doctor will talk to you about the different types of screening tests. They can help you decide which screenings to have. They can also explain what the results might mean.   Answering questions - The physical is a good time to ask the doctor or nurse questions about your health. If needed, they can refer you to other doctors or specialists, too.  Adults older than 65 years often need other care, too. As you get older, your doctor will talk to you about:   How to prevent falling at " home   Hearing or vision tests   Memory testing   How to take your medicines safely   Making sure that you have the help and support you need at home  All topics are updated as new evidence becomes available and our peer review process is complete.  This topic retrieved from [employer] on: May 02, 2024.  Topic 427326 Version 1.0  Release: 32.4.3 - C32.122  © 2024 UpToDate, Inc. and/or its affiliates. All rights reserved.  Consumer Information Use and Disclaimer   Disclaimer: This generalized information is a limited summary of diagnosis, treatment, and/or medication information. It is not meant to be comprehensive and should be used as a tool to help the user understand and/or assess potential diagnostic and treatment options. It does NOT include all information about conditions, treatments, medications, side effects, or risks that may apply to a specific patient. It is not intended to be medical advice or a substitute for the medical advice, diagnosis, or treatment of a health care provider based on the health care provider's examination and assessment of a patient's specific and unique circumstances. Patients must speak with a health care provider for complete information about their health, medical questions, and treatment options, including any risks or benefits regarding use of medications. This information does not endorse any treatments or medications as safe, effective, or approved for treating a specific patient. UpToDate, Inc. and its affiliates disclaim any warranty or liability relating to this information or the use thereof.The use of this information is governed by the Terms of Use, available at https://www.woltersGroundedPoweruwer.com/en/know/clinical-effectiveness-terms. 2024© UpToDate, Inc. and its affiliates and/or licensors. All rights reserved.  Copyright   © 2024 UpToDate, Inc. and/or its affiliates. All rights reserved.

## 2025-03-28 NOTE — ASSESSMENT & PLAN NOTE
KENY-7 Flowsheet Screening    Flowsheet Row Most Recent Value   Over the last two weeks, how often have you been bothered by the following problems?     Feeling nervous, anxious, or on edge 3   Not being able to stop or control worrying 3   Worrying too much about different things 3   Trouble relaxing  3   Being so restless that it's hard to sit still 3   Becoming easily annoyed or irritable  3   Feeling afraid as if something awful might happen 3   How difficult have these problems made it for you to do your work, take care of things at home, or get along with other people?  Extremely difficult   KENY Score  21      Patient had stopped taking zoloft in hopes that she didn't need it anymore, she wants to restart medication but had more success with fluoxetine. Will restart, directed patient to break it in half for the first week. Will follow up in 1 month.    Orders:  •  FLUoxetine 10 MG tablet; Take 1 tablet (10 mg total) by mouth daily  •  CBC and differential; Future  •  Comprehensive metabolic panel; Future  •  TSH, 3rd generation with Free T4 reflex; Future

## 2025-03-28 NOTE — PROGRESS NOTES
Adult Annual Physical  Name: Satya Bridges      : 2005      MRN: [id number]  Encounter Provider: JIMMY Terrell  Encounter Date: 3/28/2025   Encounter department: [facility] [place]    Assessment & Plan  Annual physical exam  History completed and reviewed.  Will update labs.       Anxiety  KENY-7 Flowsheet Screening    Flowsheet Row Most Recent Value   Over the last two weeks, how often have you been bothered by the following problems?     Feeling nervous, anxious, or on edge 3   Not being able to stop or control worrying 3   Worrying too much about different things 3   Trouble relaxing  3   Being so restless that it's hard to sit still 3   Becoming easily annoyed or irritable  3   Feeling afraid as if something awful might happen 3   How difficult have these problems made it for you to do your work, take care of things at home, or get along with other people?  Extremely difficult   KENY Score  21      Patient had stopped taking zoloft in hopes that she didn't need it anymore, she wants to restart medication but had more success with fluoxetine. Will restart, directed patient to break it in half for the first week. Will follow up in 1 month.    Orders:  •  FLUoxetine 10 MG tablet; Take 1 tablet (10 mg total) by mouth daily  •  CBC and differential; Future  •  Comprehensive metabolic panel; Future  •  TSH, 3rd generation with Free T4 reflex; Future    Screening for diabetes mellitus  Orders:  •  Comprehensive metabolic panel; Future    Screening for heart disease  Orders:  •  Lipid panel; Future    Potential exposure to STD  Orders:  •  HIV 1/2 AG/AB w Reflex [facility] for 2 yr old and above; Future  •  Hepatitis C antibody; Future  •  Chlamydia/GC amplified DNA by PCR; Future  •  RPR-Syphilis Screening (Total Syphilis IGG/IGM); Future    Preventive Screenings:  - Diabetes Screening: risks/benefits discussed and orders placed  - Cholesterol Screening: risks/benefits discussed and  orders placed   - Chlamydia Screening: risks/benefits discussed and orders placed   - Hepatitis C screening: screening up-to-date, risks/benefits discussed and orders placed   - HIV screening: screening up-to-date, orders placed and risks/benefits discussed   - Cervical cancer screening: screening up-to-date   - Lung cancer screening: screening not indicated     Counseling/Anticipatory Guidance:  - Alcohol: discussed moderation in alcohol intake and recommendations for healthy alcohol use.   - Drug use: discussed harms of illicit drug use and how it can negatively impact mental/physical health.   - Tobacco use: discussed harms of tobacco use and management options for quitting.   - Dental health: discussed importance of regular tooth brushing, flossing, and dental visits.   - Sexual health: discussed sexually transmitted diseases, partner selection, use of condoms, avoidance of unintended pregnancy, and contraceptive alternatives.   - Diet: discussed recommendations for a healthy/well-balanced diet.   - Exercise: the importance of regular exercise/physical activity was discussed. Recommend exercise 3-5 times per week for at least 30 minutes.   - Injury prevention: discussed safety/seat belts, safety helmets, smoke detectors, carbon monoxide detectors, and smoking near bedding or upholstery.       Depression Screening and Follow-up Plan: Patient was screened for depression during today's encounter. They screened negative with a PHQ-2 score of 2.      Tobacco Cessation Counseling: Tobacco cessation counseling was provided. The patient is sincerely urged to quit consumption of tobacco. She is not ready to quit tobacco.         History of Present Illness   {?Quick Links Encounters * My Last Note * Last Note in Specialty * Snapshot * Since Last Visit * History :24172}  Adult Annual Physical:  Patient presents for annual physical.     Diet and Physical Activity:  - Diet/Nutrition: well balanced diet and limited junk  "food.  - Exercise: 3-4 times a week on average.    Depression Screening:  - PHQ-2 Score: 2    General Health:  - Sleep: sleeps poorly and 4-6 hours of sleep on average.  - Hearing: normal hearing bilateral ears.  - Vision: no vision problems.  - Dental: regular dental visits.    /GYN Health:  - Follows with GYN: yes.   - Last menstrual cycle: 3/10/2025.   - History of STDs: yes  - Contraception: oral contraceptives.      Advanced Care Planning:  - Has an advanced directive?: no    - Has a durable medical POA?: no      Review of Systems   Constitutional:  Positive for fatigue. Negative for chills and fever.   HENT:  Negative for congestion, ear pain, rhinorrhea and sore throat.    Eyes:  Negative for pain and visual disturbance.   Respiratory:  Negative for cough, chest tightness and shortness of breath.    Cardiovascular:  Negative for chest pain, palpitations and leg swelling.   Gastrointestinal:  Negative for abdominal pain, constipation, diarrhea, nausea and vomiting.   Endocrine: Negative.    Genitourinary:  Negative for dysuria, frequency, hematuria and urgency.   Musculoskeletal:  Negative for arthralgias and back pain.   Skin:  Negative for color change and rash.   Allergic/Immunologic: Negative.    Neurological:  Negative for dizziness, seizures, syncope and headaches.   Hematological: Negative.    Psychiatric/Behavioral:  The patient is nervous/anxious.    All other systems reviewed and are negative.        Objective {?Quick Links Trend Vitals * Enter New Vitals * Results Review * Timeline (Adult) * Labs * Imaging * Cardiology * Procedures * Lung Cancer Screening * Surgical eConsent :76244}  /60 (BP Location: Right arm, Patient Position: Sitting, Cuff Size: Standard)   Pulse 90   Ht 5' 3\" (1.6 m)   Wt 53.4 kg (117 lb 12.8 oz)   LMP 03/10/2025   SpO2 99%   BMI 20.87 kg/m²     Physical Exam  Vitals and nursing note reviewed.   Constitutional:       General: She is not in acute distress.     " Appearance: She is well-developed.   HENT:      Right Ear: Tympanic membrane normal.      Left Ear: Tympanic membrane normal.      Nose: No congestion.      Mouth/Throat:      Pharynx: Oropharynx is clear.   Eyes:      Conjunctiva/sclera: Conjunctivae normal.   Cardiovascular:      Rate and Rhythm: Normal rate and regular rhythm.      Pulses: Normal pulses.      Heart sounds: Normal heart sounds. No murmur heard.  Pulmonary:      Effort: Pulmonary effort is normal. No respiratory distress.      Breath sounds: Normal breath sounds.   Abdominal:      General: Bowel sounds are normal.      Palpations: Abdomen is soft.      Tenderness: There is no abdominal tenderness.   Musculoskeletal:         General: No swelling. Normal range of motion.      Cervical back: Normal range of motion and neck supple.   Skin:     General: Skin is warm and dry.      Capillary Refill: Capillary refill takes less than 2 seconds.   Neurological:      Mental Status: She is alert and oriented to person, place, and time.   Psychiatric:         Mood and Affect: Mood normal.

## 2025-03-29 ENCOUNTER — APPOINTMENT (OUTPATIENT)
Dept: LAB | Facility: HOSPITAL | Age: 20
End: 2025-03-29
Payer: COMMERCIAL

## 2025-03-29 DIAGNOSIS — F41.9 ANXIETY: ICD-10-CM

## 2025-03-29 DIAGNOSIS — Z13.1 SCREENING FOR DIABETES MELLITUS: ICD-10-CM

## 2025-03-29 DIAGNOSIS — Z20.2 POTENTIAL EXPOSURE TO STD: ICD-10-CM

## 2025-03-29 DIAGNOSIS — Z13.6 SCREENING FOR HEART DISEASE: ICD-10-CM

## 2025-03-29 LAB
ALBUMIN SERPL BCG-MCNC: 4.4 G/DL (ref 3.5–5)
ALP SERPL-CCNC: 67 U/L (ref 34–104)
ALT SERPL W P-5'-P-CCNC: 13 U/L (ref 7–52)
ANION GAP SERPL CALCULATED.3IONS-SCNC: 6 MMOL/L (ref 4–13)
AST SERPL W P-5'-P-CCNC: 17 U/L (ref 13–39)
BASOPHILS # BLD AUTO: 0.04 THOUSANDS/ÂΜL (ref 0–0.1)
BASOPHILS NFR BLD AUTO: 1 % (ref 0–1)
BILIRUB SERPL-MCNC: 0.9 MG/DL (ref 0.2–1)
BUN SERPL-MCNC: 13 MG/DL (ref 5–25)
CALCIUM SERPL-MCNC: 9.5 MG/DL (ref 8.4–10.2)
CHLORIDE SERPL-SCNC: 105 MMOL/L (ref 96–108)
CHOLEST SERPL-MCNC: 128 MG/DL (ref ?–200)
CO2 SERPL-SCNC: 26 MMOL/L (ref 21–32)
CREAT SERPL-MCNC: 0.64 MG/DL (ref 0.6–1.3)
EOSINOPHIL # BLD AUTO: 0.25 THOUSAND/ÂΜL (ref 0–0.61)
EOSINOPHIL NFR BLD AUTO: 3 % (ref 0–6)
ERYTHROCYTE [DISTWIDTH] IN BLOOD BY AUTOMATED COUNT: 12.6 % (ref 11.6–15.1)
GFR SERPL CREATININE-BSD FRML MDRD: 127 ML/MIN/1.73SQ M
GLUCOSE P FAST SERPL-MCNC: 90 MG/DL (ref 65–99)
HCT VFR BLD AUTO: 41.9 % (ref 34.8–46.1)
HCV AB SER QL: NORMAL
HDLC SERPL-MCNC: 53 MG/DL
HGB BLD-MCNC: 14 G/DL (ref 11.5–15.4)
HIV 1+2 AB+HIV1 P24 AG SERPL QL IA: NORMAL
IMM GRANULOCYTES # BLD AUTO: 0.03 THOUSAND/UL (ref 0–0.2)
IMM GRANULOCYTES NFR BLD AUTO: 0 % (ref 0–2)
LDLC SERPL CALC-MCNC: 64 MG/DL (ref 0–100)
LYMPHOCYTES # BLD AUTO: 2.54 THOUSANDS/ÂΜL (ref 0.6–4.47)
LYMPHOCYTES NFR BLD AUTO: 29 % (ref 14–44)
MCH RBC QN AUTO: 30.4 PG (ref 26.8–34.3)
MCHC RBC AUTO-ENTMCNC: 33.4 G/DL (ref 31.4–37.4)
MCV RBC AUTO: 91 FL (ref 82–98)
MONOCYTES # BLD AUTO: 0.55 THOUSAND/ÂΜL (ref 0.17–1.22)
MONOCYTES NFR BLD AUTO: 6 % (ref 4–12)
NEUTROPHILS # BLD AUTO: 5.31 THOUSANDS/ÂΜL (ref 1.85–7.62)
NEUTS SEG NFR BLD AUTO: 61 % (ref 43–75)
NONHDLC SERPL-MCNC: 75 MG/DL
NRBC BLD AUTO-RTO: 0 /100 WBCS
PLATELET # BLD AUTO: 326 THOUSANDS/UL (ref 149–390)
PMV BLD AUTO: 10.3 FL (ref 8.9–12.7)
POTASSIUM SERPL-SCNC: 3.7 MMOL/L (ref 3.5–5.3)
PROT SERPL-MCNC: 6.9 G/DL (ref 6.4–8.4)
RBC # BLD AUTO: 4.61 MILLION/UL (ref 3.81–5.12)
SODIUM SERPL-SCNC: 137 MMOL/L (ref 135–147)
TREPONEMA PALLIDUM IGG+IGM AB [PRESENCE] IN SERUM OR PLASMA BY IMMUNOASSAY: NORMAL
TRIGL SERPL-MCNC: 53 MG/DL (ref ?–150)
TSH SERPL DL<=0.05 MIU/L-ACNC: 2.2 UIU/ML (ref 0.45–4.5)
WBC # BLD AUTO: 8.72 THOUSAND/UL (ref 4.31–10.16)

## 2025-03-29 PROCEDURE — 87591 N.GONORRHOEAE DNA AMP PROB: CPT

## 2025-03-29 PROCEDURE — 86780 TREPONEMA PALLIDUM: CPT

## 2025-03-29 PROCEDURE — 80061 LIPID PANEL: CPT

## 2025-03-29 PROCEDURE — 80053 COMPREHEN METABOLIC PANEL: CPT

## 2025-03-29 PROCEDURE — 84443 ASSAY THYROID STIM HORMONE: CPT

## 2025-03-29 PROCEDURE — 87389 HIV-1 AG W/HIV-1&-2 AB AG IA: CPT

## 2025-03-29 PROCEDURE — 36415 COLL VENOUS BLD VENIPUNCTURE: CPT

## 2025-03-29 PROCEDURE — 87491 CHLMYD TRACH DNA AMP PROBE: CPT

## 2025-03-29 PROCEDURE — 86803 HEPATITIS C AB TEST: CPT

## 2025-03-29 PROCEDURE — 85025 COMPLETE CBC W/AUTO DIFF WBC: CPT

## 2025-03-30 LAB
C TRACH DNA SPEC QL NAA+PROBE: NEGATIVE
N GONORRHOEA DNA SPEC QL NAA+PROBE: NEGATIVE

## 2025-03-31 ENCOUNTER — RESULTS FOLLOW-UP (OUTPATIENT)
Dept: INTERNAL MEDICINE CLINIC | Facility: CLINIC | Age: 20
End: 2025-03-31

## 2025-04-29 ENCOUNTER — OFFICE VISIT (OUTPATIENT)
Dept: INTERNAL MEDICINE CLINIC | Facility: CLINIC | Age: 20
End: 2025-04-29
Payer: COMMERCIAL

## 2025-04-29 VITALS
DIASTOLIC BLOOD PRESSURE: 68 MMHG | HEART RATE: 88 BPM | SYSTOLIC BLOOD PRESSURE: 108 MMHG | HEIGHT: 63 IN | WEIGHT: 121 LBS | RESPIRATION RATE: 17 BRPM | BODY MASS INDEX: 21.44 KG/M2 | OXYGEN SATURATION: 98 %

## 2025-04-29 DIAGNOSIS — F41.9 ANXIETY: Primary | ICD-10-CM

## 2025-04-29 PROCEDURE — 99213 OFFICE O/P EST LOW 20 MIN: CPT

## 2025-04-29 NOTE — ASSESSMENT & PLAN NOTE
KENY-7 Flowsheet Screening    Flowsheet Row Most Recent Value   Over the last two weeks, how often have you been bothered by the following problems?     Feeling nervous, anxious, or on edge 1   Not being able to stop or control worrying 3   Worrying too much about different things 3   Trouble relaxing  1   Being so restless that it's hard to sit still 0   Becoming easily annoyed or irritable  3   Feeling afraid as if something awful might happen 0   How difficult have these problems made it for you to do your work, take care of things at home, or get along with other people?  Somewhat difficult   KENY Score  11      Significant improvement in her KENY score, decreased from a score of 21 now at 11.  Continue Prozac 10 mg daily, patient is comfortable at this dosing.  She states an improvement in her sleep, no more sleep paralysis issues.  She also notes less anxiety attacks.  Patient will reach out with any further concerns.  She will be back for her annual physical next year.

## 2025-04-29 NOTE — PROGRESS NOTES
Name: Satya Bridges      : 2005      MRN: [id number]  Encounter Provider: JIMMY Terrell  Encounter Date: 2025   Encounter department: [facility] INTERNAL MEDICINE [place]  :  Assessment & Plan  Anxiety  KENY-7 Flowsheet Screening    Flowsheet Row Most Recent Value   Over the last two weeks, how often have you been bothered by the following problems?     Feeling nervous, anxious, or on edge 1   Not being able to stop or control worrying 3   Worrying too much about different things 3   Trouble relaxing  1   Being so restless that it's hard to sit still 0   Becoming easily annoyed or irritable  3   Feeling afraid as if something awful might happen 0   How difficult have these problems made it for you to do your work, take care of things at home, or get along with other people?  Somewhat difficult   KENY Score  11      Significant improvement in her KENY score, decreased from a score of 21 now at 11.  Continue Prozac 10 mg daily, patient is comfortable at this dosing.  She states an improvement in her sleep, no more sleep paralysis issues.  She also notes less anxiety attacks.  Patient will reach out with any further concerns.  She will be back for her annual physical next year.              History of Present Illness {?Quick Links Encounters * My Last Note * Last Note in Specialty * Snapshot * Since Last Visit * History :88057}  Satya is here today for follow up. At her previous visit she was restarted on Prozac 10mg daily after she had stopped her zoloft at home. She's been sleeping better, anxiety attacks are better.  Her goal is to not be on any medication, but she is accepting that if she needs to be on medication currently then she is willing to do so.  She has hopes to decrease or come off of the Prozac in the future.      Review of Systems   Constitutional:  Negative for chills and fever.   HENT:  Negative for congestion, ear pain, rhinorrhea and sore throat.    Eyes:  Negative for  "pain and visual disturbance.   Respiratory:  Negative for cough, chest tightness and shortness of breath.    Cardiovascular:  Negative for chest pain, palpitations and leg swelling.   Gastrointestinal:  Negative for abdominal pain, constipation, diarrhea, nausea and vomiting.   Endocrine: Negative.    Genitourinary:  Negative for dysuria, frequency, hematuria and urgency.   Musculoskeletal:  Negative for arthralgias and back pain.   Skin:  Negative for color change and rash.   Allergic/Immunologic: Negative.    Neurological:  Negative for dizziness, seizures, syncope and headaches.   Hematological: Negative.    Psychiatric/Behavioral:  The patient is nervous/anxious.    All other systems reviewed and are negative.      Objective {?Quick Links Trend Vitals * Enter New Vitals * Results Review * Timeline (Adult) * Labs * Imaging * Cardiology * Procedures * Lung Cancer Screening * Surgical eConsent :12185}  /68 (BP Location: Left arm, Patient Position: Sitting, Cuff Size: Adult)   Pulse 88   Resp 17   Ht 5' 3\" (1.6 m)   Wt 54.9 kg (121 lb)   SpO2 98%   BMI 21.43 kg/m²      Physical Exam  Vitals and nursing note reviewed.   Constitutional:       General: She is not in acute distress.     Appearance: She is well-developed.   Cardiovascular:      Rate and Rhythm: Normal rate and regular rhythm.      Pulses: Normal pulses.      Heart sounds: Normal heart sounds. No murmur heard.  Pulmonary:      Effort: Pulmonary effort is normal. No respiratory distress.      Breath sounds: Normal breath sounds.   Abdominal:      General: Bowel sounds are normal.      Palpations: Abdomen is soft.      Tenderness: There is no abdominal tenderness.   Musculoskeletal:         General: No swelling. Normal range of motion.      Cervical back: Normal range of motion and neck supple.   Skin:     General: Skin is warm and dry.      Capillary Refill: Capillary refill takes less than 2 seconds.   Neurological:      Mental Status: She is " alert and oriented to person, place, and time.   Psychiatric:         Mood and Affect: Mood normal.

## 2025-06-19 ENCOUNTER — OFFICE VISIT (OUTPATIENT)
Dept: INTERNAL MEDICINE CLINIC | Facility: CLINIC | Age: 20
End: 2025-06-19
Payer: COMMERCIAL

## 2025-06-19 VITALS
WEIGHT: 120 LBS | OXYGEN SATURATION: 98 % | HEIGHT: 63 IN | HEART RATE: 110 BPM | BODY MASS INDEX: 21.26 KG/M2 | DIASTOLIC BLOOD PRESSURE: 68 MMHG | SYSTOLIC BLOOD PRESSURE: 106 MMHG | RESPIRATION RATE: 17 BRPM

## 2025-06-19 DIAGNOSIS — F41.9 ANXIETY: Primary | ICD-10-CM

## 2025-06-19 PROCEDURE — 99213 OFFICE O/P EST LOW 20 MIN: CPT

## 2025-06-19 RX ORDER — BUPROPION HYDROCHLORIDE 150 MG/1
150 TABLET ORAL EVERY MORNING
Qty: 90 TABLET | Refills: 0 | Status: SHIPPED | OUTPATIENT
Start: 2025-06-19 | End: 2025-12-16

## 2025-06-19 NOTE — PROGRESS NOTES
Name: Satya Bridges      : 2005      MRN: [id number]  Encounter Provider: JIMMY Terrell  Encounter Date: 2025   Encounter department: [facility] INTERNAL MEDICINE [place]  :  Assessment & Plan  Anxiety  Patient has been taking fluoxetine 10 mg daily for 3 months now, recently decreased to 5mg independently to see if this reduces her anhedonia and lack of sexual drive. Discussed starting Wellbutrin to help counteract these side effects. Patient is agreeable to try.  Will continue 5 mg fluoxetine for now.  Will follow up in 4 weeks.  Orders:  •  buPROPion (WELLBUTRIN XL) 150 mg 24 hr tablet; Take 1 tablet (150 mg total) by mouth every morning           History of Present Illness {?Quick Links Encounters * My Last Note * Last Note in Specialty * Snapshot * Since Last Visit * History :50851}  Satya is here today to discuss her fluoxetine.  She has been experiencing a lack of interest in life, sexual desire, states she feels like she is just watching things happen in her life with no concern.  She did start breaking her fluoxetine in half so she has been taking 5 mg to try and help counteract this change in her interest.  She states she has taken Wellbutrin in the past, did not make a difference for her but she feels she did not give it a chance.      Review of Systems   Constitutional:  Negative for chills and fever.   HENT:  Negative for congestion, ear pain, rhinorrhea and sore throat.    Eyes:  Negative for pain and visual disturbance.   Respiratory:  Negative for cough, chest tightness and shortness of breath.    Cardiovascular:  Negative for chest pain, palpitations and leg swelling.   Gastrointestinal:  Negative for abdominal pain, constipation, diarrhea, nausea and vomiting.   Skin:  Negative for color change and rash.   Neurological:  Negative for dizziness, seizures, syncope and headaches.   Hematological: Negative.    Psychiatric/Behavioral:  Positive for dysphoric mood.  "Negative for suicidal ideas.    All other systems reviewed and are negative.      Objective {?Quick Links Trend Vitals * Enter New Vitals * Results Review * Timeline (Adult) * Labs * Imaging * Cardiology * Procedures * Lung Cancer Screening * Surgical eConsent :47435}  /68 (BP Location: Right arm, Patient Position: Sitting, Cuff Size: Standard)   Pulse (!) 110   Resp 17   Ht 5' 3\" (1.6 m)   Wt 54.4 kg (120 lb)   SpO2 98%   BMI 21.26 kg/m²      Physical Exam  Vitals and nursing note reviewed.   Constitutional:       General: She is not in acute distress.     Appearance: She is well-developed.     Cardiovascular:      Rate and Rhythm: Normal rate and regular rhythm.      Pulses: Normal pulses.      Heart sounds: Normal heart sounds. No murmur heard.  Pulmonary:      Effort: Pulmonary effort is normal. No respiratory distress.      Breath sounds: Normal breath sounds.   Abdominal:      General: Bowel sounds are normal.     Musculoskeletal:      Cervical back: Normal range of motion and neck supple.     Neurological:      Mental Status: She is alert and oriented to person, place, and time.     Psychiatric:         Mood and Affect: Mood normal. Affect is flat.         "

## 2025-06-19 NOTE — ASSESSMENT & PLAN NOTE
Patient has been taking fluoxetine 10 mg daily for 3 months now, recently decreased to 5mg independently to see if this reduces her anhedonia and lack of sexual drive. Discussed starting Wellbutrin to help counteract these side effects. Patient is agreeable to try.  Will continue 5 mg fluoxetine for now.  Will follow up in 4 weeks.  Orders:  •  buPROPion (WELLBUTRIN XL) 150 mg 24 hr tablet; Take 1 tablet (150 mg total) by mouth every morning

## 2025-07-09 NOTE — PATIENT INSTRUCTIONS
Patient Education     Lowering Your Risk of Breast Cancer   About this topic   Breast cancer is a serious illness. Breast cancer is when abnormal cells grow and divide more quickly in your breast. These cells form a growth or tumor. The abnormal cells may enter nearby tissue and spread to other parts of the body. It is the type of cancer most often seen in women. Men can have breast cancer, but it is a rare condition.  General   Some things in your life may increase your risk of breast cancer. You may not be able to change some of these. Others you can control.  You are more likely to get breast cancer if you:  Have a mother, sister, or daughter who has had breast cancer  Have used hormones for menopause for more than 5 years  Have had radiation therapy to the breast or chest in the past  Are overweight or do not exercise  Had your first menstrual period before you were 11 years old  Went through menopause after age 53  Have never been pregnant or had your first child after age 35  Have had breast cancer before  Drink alcohol in any form  Have dense breasts  Are older in age  There is no certain way to prevent breast cancer. There are things you can do to lower your chances of having breast cancer.  Keep a healthy weight. Lose weight if you are overweight. Being overweight raises your chances of having breast cancer.  Eat a healthy diet to maintain a healthy weight, such as more fruits, vegetables, and lean cuts of meat. Decrease the amount of saturated fat in your diet.  Exercise. Being active helps you keep a healthy weight.  Limit your alcohol intake or do not drink alcohol. The more alcohol you drink, the higher your risk.  Do not smoke cigarettes. Smoking can increase your risk of many types of cancer.  Breastfeed your baby. This may help protect you. The longer you breastfeed, the more protection you have.  Talk with your doctor about:  Limiting or stopping hormone therapy.  Taking certain drugs to prevent  breast cancer. For women at high risk of having breast cancer, there are a few drugs that may lower your risk.  Surgery to prevent you from having breast cancer if you are very high risk.  When do I need to call the doctor?   Changes in your breasts  A lump or area in your breast that feels different  Discharge from your nipple  Skin on your breast is dimpled or indented  You have questions or concerns about your breasts  Helpful tips   Talk to your doctor about the best kind of breast cancer screening for you.  If you want to do self breast exams, have your doctor show you the right way to do them.  Tell your doctor of any abnormal finding.  Last Reviewed Date   2021-10-04  Consumer Information Use and Disclaimer   This generalized information is a limited summary of diagnosis, treatment, and/or medication information. It is not meant to be comprehensive and should be used as a tool to help the user understand and/or assess potential diagnostic and treatment options. It does NOT include all information about conditions, treatments, medications, side effects, or risks that may apply to a specific patient. It is not intended to be medical advice or a substitute for the medical advice, diagnosis, or treatment of a health care provider based on the health care provider's examination and assessment of a patient’s specific and unique circumstances. Patients must speak with a health care provider for complete information about their health, medical questions, and treatment options, including any risks or benefits regarding use of medications. This information does not endorse any treatments or medications as safe, effective, or approved for treating a specific patient. [employer] and its affiliates disclaim any warranty or liability relating to this information or the use thereof. The use of this information is governed by the Terms of Use, available at  https://www.[employer]ters[employer] En[employer] Productionsuwer.com/en/know/clinical-effectiveness-terms   Copyright   Copyright © 2024 [employer] and its affiliates and/or licensors. All rights reserved.       Perineal Hygiene      Your vaginal naturally takes care of its self, it is a self washing system, the less you mess the healthier it will be     Please do not use any anti bacterial soaps,  liquid soaps, body wash or feminine wash to the vulva, these products can cause dermitis, bacterial infections and other vulvar problems.   Your partner should avoid the same products as well to genital area.  Use only water to cleanse vulva, if you feel you need soap you may use a small amount of  Dove White Bar or Dove White Sensitive Skin Bar soap ( no liquid soap) if necessary.    Avoid the use of washcloths, exfoliating jono's, netted scrubbers, or loofa's, use your hands only to cleanse the vulvar tissues    No scented lotions or products are advised in or near your vulva.    Use coconut oil in its solid form for moisture if needed.  No douching this may cause imbalance in your vaginal PH and further issues.    If you wear panty liners, you may apply a thin coating of Vaseline, A&D ointment or coconut oil to the vulvar tissues as a skin barrier     Wear Cotton underware, and loose fitting clothing  Use perfume-free, dye-free laundry detergent for under garments, use a second rinse cycle   Avoid fabric softeners/dryer sheets.         Over the counter probiotic to restore vaginal cheli may be helpful as well, take daily.       You may also look into Boric Acid vaginal suppositories to restore vaginal PH balance for up to 2 weeks as directed on the box. You may not use these if you are pregnant      For vaginal dryness:      You may use:     Coconut oil in solid form, not liquid (organic, pure, unscented) as needed for moisture or lubrication. ( Do not use if allergic)       Replens moisture restore external comfort gel daily ( use as directed on the  box)        Replens long lasting vaginal moisturizer  ( use as directed on the box)     May try NewLife vulvar moisturizer ( found on Amazon )    May try Revaree vaginal inserts        For Vaginal Lubrication:          You may use:     Coconut oil in solid form, not liquid (organic, pure, unscented) as a lubricant or another scent-free lubricant (Astroglide, Uberlube) if needed.  Do not use coconut oil or silicone if using a condom as this may break down the integrity of the condom and cause an unplanned pregnancy              Do not use coconut oil if allergic               Replens silky smooth lubricant, premium silicone based lubricant for intercourse. ( use as directed, a small amount will provide an enhanced natural feeling)     Any premium over the counter vaginal lubricant water or silicone based. Silicone based will have more staying power and friction relief         For Vulvar irritation/itching:        You may use Hydrocortisone 1 % over the counter to external vulvar tissues 2 x daily for 5-7 days to help with irriation and itch relief.  Birth Control Pills     Birth control pills  are also called oral contraceptives, or the pill. It is medicine that helps prevent pregnancy by stopping ovulation. Ovulation is when the ovaries make and release an egg cell each month. If this egg gets fertilized by sperm, pregnancy occurs. You will need to take the pill at the same time every day. Your healthcare provider will tell you when to start taking the pill. You will also be told what to do if you miss a dose. Instructions will depend on the kind of birth control pills you are taking.   Different kinds of birth control pills:  Some kinds are taken for 21 days in a row, followed by 7 days of placebo (no hormones) pills. Other kinds are taken for 24 days followed by 4 days of placebos. Each kind has a certain amount of female hormones. Your provider will decide on the kind that is best for you based on your age and  other health conditions.  Call your local emergency number (911 in the [place]) for any of the following:   You have any of the following signs of a stroke:      Numbness or drooping on one side of your face     Weakness in an arm or leg    Confusion or difficulty speaking    Dizziness, a severe headache, or vision loss    You feel lightheaded, short of breath, and have chest pain.    You cough up blood.    Seek care immediately if:   Your arm or leg feels warm, tender, and painful. It may look swollen and red.    You have severe pain, numbness, or swelling in your arms or legs.    Contact your healthcare provider if:   You have forgotten to take a birth control pill.    You have mood changes, such as depression, since starting birth control pills.    You have nausea or are vomiting.    You have severe abdominal pain.    You missed a period and have questions or concerns about being pregnant.    You still have bleeding 4 months after taking birth control pills correctly.    You have questions or concerns about your condition or care.    What may be done before you can start taking birth control pills:  You need to see your healthcare provider to get a prescription. Any of the following may be done before your healthcare provider gives you a prescription:  Your healthcare provider will ask about diseases and illnesses you have had in the past. Your provider will check your risk for blood clots, heart conditions, or stroke. Tell your provider if you had gastric bypass surgery. This surgery can affect the way your body absorbs medicines such as birth control pills.    Your provider will also check your blood pressure, and may do a breast and pelvic exam. A Pap smear may also be done during the pelvic exam. This is a test to make sure you do not have abnormal changes on your cervix. You may need other tests, such as a urine test to make sure you are not pregnant.    Your provider will ask if you take any medicines and if you  smoke. Smoking increases your risk for stroke, heart attack, or a blood clot in your lungs. If you smoke, you should not take certain kinds of birth control pills.    Advantages of birth control pills:  When birth control pills are used correctly, the chances of getting pregnant are very low. Birth control pills may help decrease bleeding and pain during your monthly period. They may also help prevent cancer of the uterus and ovaries.  Disadvantages of birth control pills:  You may have sudden changes in your mood or feelings while you take birth control pills. You may have nausea and a decreased sex drive. You may have an increased appetite and rapid weight gain. You may also have bleeding in between periods, less frequent periods, vaginal dryness, and breast pain. Birth control pills will not protect you from sexually transmitted infections. Rarely, some birth control pills can increase your risk for a blood clot. This may become life-threatening.  If you decide you want to get pregnant:  If you are planning to have a baby, ask your healthcare provider when you may stop taking your birth control pills. It may take some time for you to start ovulating again. Ask your healthcare provider for more information about pregnancy after birth control pills.  When to start taking birth control pills after you have a baby:  If you are not breastfeeding, you may start taking birth control pills 3 weeks after you give birth. You may be able to take certain types of birth control pills if you are breastfeeding. These pills can be started from 6 weeks to 6 months after you give birth. Ask your healthcare provider for more information about when to start taking birth control pills after you give birth.  What you need to know about birth control pills and menopause:   Talk with your healthcare provider if you want to take birth control pills around menopause.     Around age 45, you will enter into perimenopause. This means your  hormone levels are dropping and you are ovulating less often. You can still become pregnant during this time. The risk for problems, such as miscarriage, are higher if you become pregnant after age 45. Birth control pills will prevent pregnancy, and may also help prevent or relieve some signs and symptoms of menopause. Examples are hot flashes and mood swings.     Your provider will do tests when you are around age 50. The tests may show that you are in menopause. If the tests do not show menopause for sure, you may be able to continue taking the pill up to age 55. The decision will depend on your health and if you have any medical conditions, such as a blood clot.    Do not smoke:  Nicotine and other chemicals in cigarettes and cigars increase your risk for a blood clot while you use birth control pills. The risk is higher if you are also 35 or older. Ask your healthcare provider for information if you currently smoke and need help to quit. E-cigarettes or smokeless tobacco still contain nicotine. Talk to your healthcare provider before you use these products.  Follow up with your healthcare provider as directed:  Write down your questions so you remember to ask them during your visits.  © Copyright [employer] 2020 Information is for End User's use only and may not be sold, redistributed or otherwise used for commercial purposes. All illustrations and images included in CareNotes® are the copyrighted property of [employer] Inc[employer] or [employer]  The above information is an  only. It is not intended as medical advice for individual conditions or treatments. Talk to your doctor, nurse or pharmacist before following any medical regimen to see if it is safe and effective for you.

## 2025-07-11 ENCOUNTER — ANNUAL EXAM (OUTPATIENT)
Dept: OBGYN CLINIC | Facility: CLINIC | Age: 20
End: 2025-07-11
Payer: COMMERCIAL

## 2025-07-11 VITALS
BODY MASS INDEX: 21.09 KG/M2 | DIASTOLIC BLOOD PRESSURE: 70 MMHG | WEIGHT: 119 LBS | HEIGHT: 63 IN | SYSTOLIC BLOOD PRESSURE: 110 MMHG

## 2025-07-11 DIAGNOSIS — Z11.3 SCREENING FOR STDS (SEXUALLY TRANSMITTED DISEASES): ICD-10-CM

## 2025-07-11 DIAGNOSIS — Z30.41 ENCOUNTER FOR SURVEILLANCE OF CONTRACEPTIVE PILLS: ICD-10-CM

## 2025-07-11 DIAGNOSIS — Z01.419 ENCOUNTER FOR GYNECOLOGICAL EXAMINATION WITHOUT ABNORMAL FINDING: Primary | ICD-10-CM

## 2025-07-11 PROCEDURE — 99395 PREV VISIT EST AGE 18-39: CPT | Performed by: OBSTETRICS & GYNECOLOGY

## 2025-07-11 PROCEDURE — 87491 CHLMYD TRACH DNA AMP PROBE: CPT | Performed by: OBSTETRICS & GYNECOLOGY

## 2025-07-11 PROCEDURE — 87591 N.GONORRHOEAE DNA AMP PROB: CPT | Performed by: OBSTETRICS & GYNECOLOGY

## 2025-07-11 RX ORDER — NORETHINDRONE ACETATE AND ETHINYL ESTRADIOL 1.5-30(21)
1 KIT ORAL DAILY
Qty: 90 TABLET | Refills: 4 | Status: SHIPPED | OUTPATIENT
Start: 2025-07-11

## 2025-07-13 LAB
C TRACH DNA SPEC QL NAA+PROBE: NEGATIVE
N GONORRHOEA DNA SPEC QL NAA+PROBE: NEGATIVE

## 2025-07-15 ENCOUNTER — OFFICE VISIT (OUTPATIENT)
Dept: INTERNAL MEDICINE CLINIC | Facility: CLINIC | Age: 20
End: 2025-07-15
Payer: COMMERCIAL

## 2025-07-15 VITALS
HEIGHT: 63 IN | DIASTOLIC BLOOD PRESSURE: 64 MMHG | WEIGHT: 121 LBS | OXYGEN SATURATION: 98 % | BODY MASS INDEX: 21.44 KG/M2 | HEART RATE: 98 BPM | SYSTOLIC BLOOD PRESSURE: 102 MMHG

## 2025-07-15 DIAGNOSIS — F41.9 ANXIETY: Primary | ICD-10-CM

## 2025-07-15 PROCEDURE — 99213 OFFICE O/P EST LOW 20 MIN: CPT

## 2025-07-15 RX ORDER — FLUOXETINE 10 MG/1
10 TABLET, FILM COATED ORAL DAILY
Qty: 90 TABLET | Refills: 3 | Status: SHIPPED | OUTPATIENT
Start: 2025-07-15

## 2025-07-15 RX ORDER — BUPROPION HYDROCHLORIDE 150 MG/1
150 TABLET ORAL EVERY MORNING
Start: 2025-07-15 | End: 2026-07-10